# Patient Record
Sex: FEMALE | Race: WHITE | NOT HISPANIC OR LATINO | Employment: OTHER | URBAN - METROPOLITAN AREA
[De-identification: names, ages, dates, MRNs, and addresses within clinical notes are randomized per-mention and may not be internally consistent; named-entity substitution may affect disease eponyms.]

---

## 2017-01-06 ENCOUNTER — AMBULATORY - HEALTHEAST (OUTPATIENT)
Dept: SLEEP MEDICINE | Facility: CLINIC | Age: 72
End: 2017-01-06

## 2017-01-06 ENCOUNTER — OFFICE VISIT - HEALTHEAST (OUTPATIENT)
Dept: SLEEP MEDICINE | Facility: CLINIC | Age: 72
End: 2017-01-06

## 2017-01-06 DIAGNOSIS — G47.8 SLEEP DYSFUNCTION WITH SLEEP STAGE DISTURBANCE: ICD-10-CM

## 2017-01-06 DIAGNOSIS — G47.10 HYPERSOMNIA, UNSPECIFIED: ICD-10-CM

## 2017-01-06 DIAGNOSIS — G47.34 SLEEP RELATED HYPOXIA: ICD-10-CM

## 2017-01-06 DIAGNOSIS — G47.33 OSA (OBSTRUCTIVE SLEEP APNEA): ICD-10-CM

## 2017-01-06 DIAGNOSIS — G47.31 CENTRAL SLEEP APNEA: ICD-10-CM

## 2017-01-06 ASSESSMENT — MIFFLIN-ST. JEOR: SCORE: 1416.22

## 2017-01-11 ENCOUNTER — RECORDS - HEALTHEAST (OUTPATIENT)
Dept: ADMINISTRATIVE | Facility: OTHER | Age: 72
End: 2017-01-11

## 2017-01-26 ENCOUNTER — RECORDS - HEALTHEAST (OUTPATIENT)
Dept: ADMINISTRATIVE | Facility: OTHER | Age: 72
End: 2017-01-26

## 2017-02-15 ENCOUNTER — RECORDS - HEALTHEAST (OUTPATIENT)
Dept: ADMINISTRATIVE | Facility: OTHER | Age: 72
End: 2017-02-15

## 2017-03-02 ENCOUNTER — COMMUNICATION - HEALTHEAST (OUTPATIENT)
Dept: CARDIOLOGY | Facility: CLINIC | Age: 72
End: 2017-03-02

## 2017-03-02 DIAGNOSIS — E78.5 HYPERLIPIDEMIA: ICD-10-CM

## 2017-03-27 ENCOUNTER — OFFICE VISIT - HEALTHEAST (OUTPATIENT)
Dept: SLEEP MEDICINE | Facility: CLINIC | Age: 72
End: 2017-03-27

## 2017-03-27 DIAGNOSIS — G47.31 CENTRAL SLEEP APNEA: ICD-10-CM

## 2017-03-27 DIAGNOSIS — G47.33 OSA (OBSTRUCTIVE SLEEP APNEA): ICD-10-CM

## 2017-03-27 DIAGNOSIS — G47.34 SLEEP RELATED HYPOXIA: ICD-10-CM

## 2017-03-27 DIAGNOSIS — R53.83 FATIGUE, UNSPECIFIED TYPE: ICD-10-CM

## 2017-03-27 ASSESSMENT — MIFFLIN-ST. JEOR: SCORE: 1402.61

## 2017-03-28 ENCOUNTER — COMMUNICATION - HEALTHEAST (OUTPATIENT)
Dept: SLEEP MEDICINE | Facility: CLINIC | Age: 72
End: 2017-03-28

## 2017-03-31 ENCOUNTER — AMBULATORY - HEALTHEAST (OUTPATIENT)
Dept: CARDIOLOGY | Facility: CLINIC | Age: 72
End: 2017-03-31

## 2017-04-05 ENCOUNTER — AMBULATORY - HEALTHEAST (OUTPATIENT)
Dept: CARDIOLOGY | Facility: CLINIC | Age: 72
End: 2017-04-05

## 2017-04-05 ENCOUNTER — OFFICE VISIT - HEALTHEAST (OUTPATIENT)
Dept: CARDIOLOGY | Facility: CLINIC | Age: 72
End: 2017-04-05

## 2017-04-05 DIAGNOSIS — E78.5 DYSLIPIDEMIA: ICD-10-CM

## 2017-04-05 DIAGNOSIS — I25.10 CORONARY ARTERY DISEASE INVOLVING NATIVE CORONARY ARTERY OF NATIVE HEART WITHOUT ANGINA PECTORIS: ICD-10-CM

## 2017-04-05 DIAGNOSIS — I10 ESSENTIAL HYPERTENSION: ICD-10-CM

## 2017-04-05 LAB
CREAT SERPL-MCNC: 0.77 MG/DL (ref 0.6–1.1)
GFR SERPL CREATININE-BSD FRML MDRD: >60 ML/MIN/1.73M2

## 2017-04-05 ASSESSMENT — MIFFLIN-ST. JEOR: SCORE: 1398.08

## 2017-04-14 ENCOUNTER — COMMUNICATION - HEALTHEAST (OUTPATIENT)
Dept: ONCOLOGY | Facility: HOSPITAL | Age: 72
End: 2017-04-14

## 2017-04-18 ENCOUNTER — COMMUNICATION - HEALTHEAST (OUTPATIENT)
Dept: CARDIOLOGY | Facility: CLINIC | Age: 72
End: 2017-04-18

## 2017-04-18 ENCOUNTER — AMBULATORY - HEALTHEAST (OUTPATIENT)
Dept: INFUSION THERAPY | Facility: HOSPITAL | Age: 72
End: 2017-04-18

## 2017-04-18 ENCOUNTER — OFFICE VISIT - HEALTHEAST (OUTPATIENT)
Dept: ONCOLOGY | Facility: HOSPITAL | Age: 72
End: 2017-04-18

## 2017-04-18 DIAGNOSIS — C50.411 BREAST CANCER OF UPPER-OUTER QUADRANT OF RIGHT FEMALE BREAST (H): ICD-10-CM

## 2017-04-18 DIAGNOSIS — Z12.31 ENCOUNTER FOR SCREENING MAMMOGRAM FOR MALIGNANT NEOPLASM OF BREAST: ICD-10-CM

## 2017-04-20 ENCOUNTER — HOSPITAL ENCOUNTER (OUTPATIENT)
Dept: CT IMAGING | Facility: CLINIC | Age: 72
Discharge: HOME | End: 2017-04-20
Attending: INTERNAL MEDICINE

## 2017-04-20 DIAGNOSIS — I25.10 CORONARY ARTERY DISEASE INVOLVING NATIVE CORONARY ARTERY OF NATIVE HEART WITHOUT ANGINA PECTORIS: ICD-10-CM

## 2017-04-20 LAB
BSA FOR ECHO PROCEDURE: 2.04 M2
CV CALCIUM SCORE AGATSTON LM: 0
CV CALCIUM SCORING AGATSON LAD: 0
CV CALCIUM SCORING AGATSTON CX: 0
CV CALCIUM SCORING AGATSTON RCA: 0
CV CALCIUM SCORING AGATSTON TOTAL: 0
LEFT VENTRICLE HEART RATE: 52 BPM

## 2017-04-20 ASSESSMENT — MIFFLIN-ST. JEOR: SCORE: 1398.08

## 2017-05-28 ENCOUNTER — COMMUNICATION - HEALTHEAST (OUTPATIENT)
Dept: CARDIOLOGY | Facility: CLINIC | Age: 72
End: 2017-05-28

## 2017-05-28 DIAGNOSIS — E78.5 HYPERLIPEMIA: ICD-10-CM

## 2017-07-06 ENCOUNTER — OFFICE VISIT - HEALTHEAST (OUTPATIENT)
Dept: SLEEP MEDICINE | Facility: CLINIC | Age: 72
End: 2017-07-06

## 2017-07-06 DIAGNOSIS — G47.8 SLEEP DYSFUNCTION WITH SLEEP STAGE DISTURBANCE: ICD-10-CM

## 2017-07-06 DIAGNOSIS — G47.31 CENTRAL SLEEP APNEA: ICD-10-CM

## 2017-07-06 DIAGNOSIS — G47.10 HYPERSOMNIA, UNSPECIFIED: ICD-10-CM

## 2017-07-06 DIAGNOSIS — G47.33 OSA (OBSTRUCTIVE SLEEP APNEA): ICD-10-CM

## 2017-07-06 ASSESSMENT — MIFFLIN-ST. JEOR: SCORE: 1420.3

## 2017-10-27 ENCOUNTER — HOSPITAL ENCOUNTER (OUTPATIENT)
Dept: MAMMOGRAPHY | Facility: HOSPITAL | Age: 72
Discharge: HOME | End: 2017-10-27

## 2017-10-27 ENCOUNTER — COMMUNICATION - HEALTHEAST (OUTPATIENT)
Dept: ONCOLOGY | Facility: HOSPITAL | Age: 72
End: 2017-10-27

## 2017-10-27 DIAGNOSIS — C50.411 BREAST CANCER OF UPPER-OUTER QUADRANT OF RIGHT FEMALE BREAST (H): ICD-10-CM

## 2017-10-27 DIAGNOSIS — Z12.31 ENCOUNTER FOR SCREENING MAMMOGRAM FOR MALIGNANT NEOPLASM OF BREAST: ICD-10-CM

## 2017-10-31 ENCOUNTER — RECORDS - HEALTHEAST (OUTPATIENT)
Dept: LAB | Facility: CLINIC | Age: 72
End: 2017-10-31

## 2017-10-31 LAB
CHOLEST SERPL-MCNC: 166 MG/DL
FASTING STATUS PATIENT QL REPORTED: YES
HDLC SERPL-MCNC: 51 MG/DL
LDLC SERPL CALC-MCNC: 90 MG/DL
TRIGL SERPL-MCNC: 126 MG/DL

## 2018-02-17 ENCOUNTER — COMMUNICATION - HEALTHEAST (OUTPATIENT)
Dept: CARDIOLOGY | Facility: CLINIC | Age: 73
End: 2018-02-17

## 2018-02-17 DIAGNOSIS — E78.5 HYPERLIPIDEMIA: ICD-10-CM

## 2018-02-19 RX ORDER — ATORVASTATIN CALCIUM 10 MG/1
TABLET, FILM COATED ORAL
Qty: 90 TABLET | Refills: 1 | Status: SHIPPED | OUTPATIENT
Start: 2018-02-19

## 2018-04-18 ENCOUNTER — AMBULATORY - HEALTHEAST (OUTPATIENT)
Dept: INFUSION THERAPY | Facility: HOSPITAL | Age: 73
End: 2018-04-18

## 2018-04-18 ENCOUNTER — OFFICE VISIT - HEALTHEAST (OUTPATIENT)
Dept: ONCOLOGY | Facility: HOSPITAL | Age: 73
End: 2018-04-18

## 2018-04-18 DIAGNOSIS — C50.411 MALIGNANT NEOPLASM OF UPPER-OUTER QUADRANT OF RIGHT BREAST IN FEMALE, ESTROGEN RECEPTOR POSITIVE (H): ICD-10-CM

## 2018-04-18 DIAGNOSIS — Z17.0 MALIGNANT NEOPLASM OF UPPER-OUTER QUADRANT OF RIGHT BREAST IN FEMALE, ESTROGEN RECEPTOR POSITIVE (H): ICD-10-CM

## 2018-04-18 DIAGNOSIS — C50.411 BREAST CANCER OF UPPER-OUTER QUADRANT OF RIGHT FEMALE BREAST (H): ICD-10-CM

## 2018-04-18 LAB
ALBUMIN SERPL-MCNC: 3.6 G/DL (ref 3.5–5)
ALP SERPL-CCNC: 84 U/L (ref 45–120)
ALT SERPL W P-5'-P-CCNC: 18 U/L (ref 0–45)
ANION GAP SERPL CALCULATED.3IONS-SCNC: 11 MMOL/L (ref 5–18)
AST SERPL W P-5'-P-CCNC: 21 U/L (ref 0–40)
BILIRUB SERPL-MCNC: 0.6 MG/DL (ref 0–1)
BUN SERPL-MCNC: 14 MG/DL (ref 8–28)
CALCIUM SERPL-MCNC: 9.8 MG/DL (ref 8.5–10.5)
CHLORIDE BLD-SCNC: 108 MMOL/L (ref 98–107)
CO2 SERPL-SCNC: 29 MMOL/L (ref 22–31)
CREAT SERPL-MCNC: 0.82 MG/DL (ref 0.6–1.1)
GFR SERPL CREATININE-BSD FRML MDRD: >60 ML/MIN/1.73M2
GLUCOSE BLD-MCNC: 121 MG/DL (ref 70–125)
POTASSIUM BLD-SCNC: 4.3 MMOL/L (ref 3.5–5)
PROT SERPL-MCNC: 7.3 G/DL (ref 6–8)
SODIUM SERPL-SCNC: 148 MMOL/L (ref 136–145)

## 2018-05-02 ENCOUNTER — AMBULATORY - HEALTHEAST (OUTPATIENT)
Dept: SCHEDULING | Facility: CLINIC | Age: 73
End: 2018-05-02

## 2018-05-02 ENCOUNTER — COMMUNICATION - HEALTHEAST (OUTPATIENT)
Dept: ONCOLOGY | Facility: HOSPITAL | Age: 73
End: 2018-05-02

## 2018-05-02 DIAGNOSIS — C50.411 MALIGNANT NEOPLASM OF UPPER-OUTER QUADRANT OF RIGHT BREAST IN FEMALE, ESTROGEN RECEPTOR POSITIVE (H): ICD-10-CM

## 2018-05-02 DIAGNOSIS — M81.0 OSTEOPOROSIS: ICD-10-CM

## 2018-05-02 DIAGNOSIS — Z17.0 MALIGNANT NEOPLASM OF UPPER-OUTER QUADRANT OF RIGHT BREAST IN FEMALE, ESTROGEN RECEPTOR POSITIVE (H): ICD-10-CM

## 2018-05-29 ENCOUNTER — COMMUNICATION - HEALTHEAST (OUTPATIENT)
Dept: ONCOLOGY | Facility: HOSPITAL | Age: 73
End: 2018-05-29

## 2018-08-02 ENCOUNTER — RECORDS - HEALTHEAST (OUTPATIENT)
Dept: LAB | Facility: CLINIC | Age: 73
End: 2018-08-02

## 2018-08-02 LAB
ANION GAP SERPL CALCULATED.3IONS-SCNC: 11 MMOL/L (ref 5–18)
BUN SERPL-MCNC: 12 MG/DL (ref 8–28)
CALCIUM SERPL-MCNC: 10.2 MG/DL (ref 8.5–10.5)
CHLORIDE BLD-SCNC: 108 MMOL/L (ref 98–107)
CO2 SERPL-SCNC: 24 MMOL/L (ref 22–31)
CREAT SERPL-MCNC: 0.86 MG/DL (ref 0.6–1.1)
GFR SERPL CREATININE-BSD FRML MDRD: >60 ML/MIN/1.73M2
GLUCOSE BLD-MCNC: 119 MG/DL (ref 70–125)
POTASSIUM BLD-SCNC: 4.6 MMOL/L (ref 3.5–5)
SODIUM SERPL-SCNC: 143 MMOL/L (ref 136–145)

## 2018-08-13 ENCOUNTER — COMMUNICATION - HEALTHEAST (OUTPATIENT)
Dept: CARDIOLOGY | Facility: CLINIC | Age: 73
End: 2018-08-13

## 2018-08-27 ENCOUNTER — COMMUNICATION - HEALTHEAST (OUTPATIENT)
Dept: CARDIOLOGY | Facility: CLINIC | Age: 73
End: 2018-08-27

## 2018-11-02 ENCOUNTER — HOSPITAL ENCOUNTER (OUTPATIENT)
Dept: MAMMOGRAPHY | Facility: CLINIC | Age: 73
Discharge: HOME | End: 2018-11-02
Attending: FAMILY MEDICINE

## 2018-11-02 DIAGNOSIS — Z12.31 VISIT FOR SCREENING MAMMOGRAM: ICD-10-CM

## 2018-11-08 ENCOUNTER — RECORDS - HEALTHEAST (OUTPATIENT)
Dept: LAB | Facility: CLINIC | Age: 73
End: 2018-11-08

## 2018-11-08 LAB
ALBUMIN SERPL-MCNC: 3.8 G/DL (ref 3.5–5)
ALP SERPL-CCNC: 82 U/L (ref 45–120)
ALT SERPL W P-5'-P-CCNC: 21 U/L (ref 0–45)
ANION GAP SERPL CALCULATED.3IONS-SCNC: 13 MMOL/L (ref 5–18)
AST SERPL W P-5'-P-CCNC: 22 U/L (ref 0–40)
BILIRUB SERPL-MCNC: 0.6 MG/DL (ref 0–1)
BUN SERPL-MCNC: 14 MG/DL (ref 8–28)
CALCIUM SERPL-MCNC: 9.9 MG/DL (ref 8.5–10.5)
CHLORIDE BLD-SCNC: 109 MMOL/L (ref 98–107)
CHOLEST SERPL-MCNC: 184 MG/DL
CO2 SERPL-SCNC: 22 MMOL/L (ref 22–31)
CREAT SERPL-MCNC: 0.8 MG/DL (ref 0.6–1.1)
FASTING STATUS PATIENT QL REPORTED: YES
GFR SERPL CREATININE-BSD FRML MDRD: >60 ML/MIN/1.73M2
GLUCOSE BLD-MCNC: 98 MG/DL (ref 70–125)
HDLC SERPL-MCNC: 55 MG/DL
LDLC SERPL CALC-MCNC: 90 MG/DL
POTASSIUM BLD-SCNC: 4 MMOL/L (ref 3.5–5)
PROT SERPL-MCNC: 6.8 G/DL (ref 6–8)
SODIUM SERPL-SCNC: 144 MMOL/L (ref 136–145)
TRIGL SERPL-MCNC: 196 MG/DL
TSH SERPL DL<=0.005 MIU/L-ACNC: 2.16 UIU/ML (ref 0.3–5)

## 2019-04-26 ENCOUNTER — RECORDS - HEALTHEAST (OUTPATIENT)
Dept: ADMINISTRATIVE | Facility: OTHER | Age: 74
End: 2019-04-26

## 2019-04-29 ENCOUNTER — OFFICE VISIT - HEALTHEAST (OUTPATIENT)
Dept: ONCOLOGY | Facility: HOSPITAL | Age: 74
End: 2019-04-29

## 2019-04-29 DIAGNOSIS — Z17.0 MALIGNANT NEOPLASM OF UPPER-OUTER QUADRANT OF RIGHT BREAST IN FEMALE, ESTROGEN RECEPTOR POSITIVE (H): ICD-10-CM

## 2019-04-29 DIAGNOSIS — C50.411 MALIGNANT NEOPLASM OF UPPER-OUTER QUADRANT OF RIGHT BREAST IN FEMALE, ESTROGEN RECEPTOR POSITIVE (H): ICD-10-CM

## 2019-10-10 ENCOUNTER — OFFICE VISIT - HEALTHEAST (OUTPATIENT)
Dept: SLEEP MEDICINE | Facility: CLINIC | Age: 74
End: 2019-10-10

## 2019-10-10 DIAGNOSIS — G47.31 CENTRAL SLEEP APNEA: ICD-10-CM

## 2019-10-10 DIAGNOSIS — R03.0 ELEVATED BLOOD PRESSURE READING: ICD-10-CM

## 2019-10-10 DIAGNOSIS — G47.33 OBSTRUCTIVE SLEEP APNEA: ICD-10-CM

## 2019-10-10 ASSESSMENT — MIFFLIN-ST. JEOR: SCORE: 1438.9

## 2019-11-12 ENCOUNTER — RECORDS - HEALTHEAST (OUTPATIENT)
Dept: LAB | Facility: CLINIC | Age: 74
End: 2019-11-12

## 2019-11-12 LAB
ALBUMIN SERPL-MCNC: 3.6 G/DL (ref 3.5–5)
ALP SERPL-CCNC: 73 U/L (ref 45–120)
ALT SERPL W P-5'-P-CCNC: 17 U/L (ref 0–45)
ANION GAP SERPL CALCULATED.3IONS-SCNC: 14 MMOL/L (ref 5–18)
AST SERPL W P-5'-P-CCNC: 19 U/L (ref 0–40)
BILIRUB SERPL-MCNC: 0.5 MG/DL (ref 0–1)
BUN SERPL-MCNC: 11 MG/DL (ref 8–28)
CALCIUM SERPL-MCNC: 9.4 MG/DL (ref 8.5–10.5)
CHLORIDE BLD-SCNC: 111 MMOL/L (ref 98–107)
CHOLEST SERPL-MCNC: 137 MG/DL
CO2 SERPL-SCNC: 19 MMOL/L (ref 22–31)
CREAT SERPL-MCNC: 0.7 MG/DL (ref 0.6–1.1)
FASTING STATUS PATIENT QL REPORTED: YES
GFR SERPL CREATININE-BSD FRML MDRD: >60 ML/MIN/1.73M2
GLUCOSE BLD-MCNC: 93 MG/DL (ref 70–125)
HDLC SERPL-MCNC: 49 MG/DL
LDLC SERPL CALC-MCNC: 68 MG/DL
POTASSIUM BLD-SCNC: 3.8 MMOL/L (ref 3.5–5)
PROT SERPL-MCNC: 6.6 G/DL (ref 6–8)
SODIUM SERPL-SCNC: 144 MMOL/L (ref 136–145)
TRIGL SERPL-MCNC: 99 MG/DL
TSH SERPL DL<=0.005 MIU/L-ACNC: 0.4 UIU/ML (ref 0.3–5)

## 2019-11-26 ENCOUNTER — HOSPITAL ENCOUNTER (OUTPATIENT)
Dept: MAMMOGRAPHY | Facility: CLINIC | Age: 74
Discharge: HOME | End: 2019-11-26
Attending: FAMILY MEDICINE

## 2019-11-26 DIAGNOSIS — Z12.31 VISIT FOR SCREENING MAMMOGRAM: ICD-10-CM

## 2019-11-29 ENCOUNTER — RECORDS - HEALTHEAST (OUTPATIENT)
Dept: ADMINISTRATIVE | Facility: OTHER | Age: 74
End: 2019-11-29

## 2019-11-29 ENCOUNTER — AMBULATORY - HEALTHEAST (OUTPATIENT)
Dept: CARDIOLOGY | Facility: CLINIC | Age: 74
End: 2019-11-29

## 2019-12-04 ENCOUNTER — OFFICE VISIT - HEALTHEAST (OUTPATIENT)
Dept: CARDIOLOGY | Facility: CLINIC | Age: 74
End: 2019-12-04

## 2019-12-04 DIAGNOSIS — E78.5 DYSLIPIDEMIA: ICD-10-CM

## 2019-12-04 DIAGNOSIS — I35.0 NONRHEUMATIC AORTIC VALVE STENOSIS: ICD-10-CM

## 2019-12-13 ENCOUNTER — HOSPITAL ENCOUNTER (OUTPATIENT)
Dept: CARDIOLOGY | Facility: HOSPITAL | Age: 74
Discharge: HOME | End: 2019-12-13
Attending: INTERNAL MEDICINE

## 2019-12-13 DIAGNOSIS — I35.0 NONRHEUMATIC AORTIC VALVE STENOSIS: ICD-10-CM

## 2019-12-13 LAB
AORTIC ROOT: 3.5 CM
AORTIC VALVE MEAN VELOCITY: 102 CM/S
AV DIMENSIONLESS INDEX VTI: 0.8
AV MEAN GRADIENT: 5 MMHG
AV PEAK GRADIENT: 11.6 MMHG
AV VALVE AREA: 2.2 CM2
BSA FOR ECHO PROCEDURE: 2.07 M2
CV BLOOD PRESSURE: ABNORMAL MMHG
CV ECHO HEIGHT: 61 IN
CV ECHO WEIGHT: 220 LBS
DOP CALC AO PEAK VEL: 170 CM/S
DOP CALC AO VTI: 34.2 CM
DOP CALC LVOT AREA: 2.83 CM2
DOP CALC LVOT DIAMETER: 1.9 CM
DOP CALC LVOT STROKE VOLUME: 76.5 CM3
DOP CALCLVOT PEAK VEL VTI: 27 CM
EJECTION FRACTION: 74 % (ref 55–75)
FRACTIONAL SHORTENING: 42 % (ref 28–44)
INTERVENTRICULAR SEPTUM IN END DIASTOLE: 1 CM (ref 0.6–0.9)
IVS/PW RATIO: 0.8
LA AREA 1: 16.5 CM2
LA AREA 2: 20.2 CM2
LEFT ATRIUM LENGTH: 5.37 CM
LEFT ATRIUM SIZE: 4 CM
LEFT ATRIUM TO AORTIC ROOT RATIO: 1.14 NO UNITS
LEFT ATRIUM VOLUME INDEX: 25.5 ML/M2
LEFT ATRIUM VOLUME: 52.8 ML
LEFT VENTRICLE CARDIAC INDEX: 2.6 L/MIN/M2
LEFT VENTRICLE CARDIAC OUTPUT: 5.4 L/MIN
LEFT VENTRICLE DIASTOLIC VOLUME INDEX: 38.6 CM3/M2 (ref 29–61)
LEFT VENTRICLE DIASTOLIC VOLUME: 80 CM3 (ref 46–106)
LEFT VENTRICLE HEART RATE: 71 BPM
LEFT VENTRICLE MASS INDEX: 106.4 G/M2
LEFT VENTRICLE SYSTOLIC VOLUME INDEX: 10.1 CM3/M2 (ref 8–24)
LEFT VENTRICLE SYSTOLIC VOLUME: 21 CM3 (ref 14–42)
LEFT VENTRICULAR INTERNAL DIMENSION IN DIASTOLE: 5 CM (ref 3.8–5.2)
LEFT VENTRICULAR INTERNAL DIMENSION IN SYSTOLE: 2.9 CM (ref 2.2–3.5)
LEFT VENTRICULAR MASS: 220.3 G
LEFT VENTRICULAR OUTFLOW TRACT MEAN GRADIENT: 3 MMHG
LEFT VENTRICULAR OUTFLOW TRACT MEAN VELOCITY: 84.4 CM/S
LEFT VENTRICULAR POSTERIOR WALL IN END DIASTOLE: 1.3 CM (ref 0.6–0.9)
LV STROKE VOLUME INDEX: 37 ML/M2
MITRAL VALVE E/A RATIO: 0.9
MV AVERAGE E/E' RATIO: 9.9 CM/S
MV DECELERATION TIME: 218 MS
MV E'TISSUE VEL-LAT: 10.6 CM/S
MV E'TISSUE VEL-MED: 8.97 CM/S
MV LATERAL E/E' RATIO: 9.2
MV MEDIAL E/E' RATIO: 10.8
MV PEAK A VELOCITY: 108 CM/S
MV PEAK E VELOCITY: 97.2 CM/S
NUC REST DIASTOLIC VOLUME INDEX: 3524 LBS
NUC REST SYSTOLIC VOLUME INDEX: 61 IN
TRICUSPID VALVE ANULAR PLANE SYSTOLIC EXCURSION: 2.5 CM

## 2019-12-13 ASSESSMENT — MIFFLIN-ST. JEOR: SCORE: 1426.43

## 2020-07-20 ENCOUNTER — COMMUNICATION - HEALTHEAST (OUTPATIENT)
Dept: ADMINISTRATIVE | Facility: HOSPITAL | Age: 75
End: 2020-07-20

## 2020-07-23 ENCOUNTER — COMMUNICATION - HEALTHEAST (OUTPATIENT)
Dept: ADMINISTRATIVE | Facility: HOSPITAL | Age: 75
End: 2020-07-23

## 2020-08-04 ENCOUNTER — OFFICE VISIT - HEALTHEAST (OUTPATIENT)
Dept: ONCOLOGY | Facility: HOSPITAL | Age: 75
End: 2020-08-04

## 2020-08-04 DIAGNOSIS — Z17.0 MALIGNANT NEOPLASM OF UPPER-OUTER QUADRANT OF RIGHT BREAST IN FEMALE, ESTROGEN RECEPTOR POSITIVE (H): ICD-10-CM

## 2020-08-04 DIAGNOSIS — C50.411 MALIGNANT NEOPLASM OF UPPER-OUTER QUADRANT OF RIGHT BREAST IN FEMALE, ESTROGEN RECEPTOR POSITIVE (H): ICD-10-CM

## 2020-08-04 DIAGNOSIS — Z12.31 ENCOUNTER FOR SCREENING MAMMOGRAM FOR MALIGNANT NEOPLASM OF BREAST: ICD-10-CM

## 2020-08-04 DIAGNOSIS — M81.0 OSTEOPOROSIS WITHOUT CURRENT PATHOLOGICAL FRACTURE, UNSPECIFIED OSTEOPOROSIS TYPE: ICD-10-CM

## 2020-08-04 ASSESSMENT — MIFFLIN-ST. JEOR: SCORE: 1436.63

## 2020-09-14 ENCOUNTER — COMMUNICATION - HEALTHEAST (OUTPATIENT)
Dept: ONCOLOGY | Facility: HOSPITAL | Age: 75
End: 2020-09-14

## 2020-09-28 ENCOUNTER — TELEPHONE (OUTPATIENT)
Dept: SLEEP MEDICINE | Facility: CLINIC | Age: 75
End: 2020-09-28

## 2020-09-28 DIAGNOSIS — G47.33 OBSTRUCTIVE SLEEP APNEA: Primary | ICD-10-CM

## 2020-09-28 NOTE — TELEPHONE ENCOUNTER
Reason for call:  Medication   If this is a refill request, has the caller requested the refill from the pharmacy already? Yes  Will the patient be using a [facility]? No  Name of the pharmacy and phone number for the current request: [facility] Fax: [phone]    Name of the medication requested: CPAP supplies    Other request: pt would like to switch to [facility]    Phone number to reach patient:  Home number on file [phone] (home)    Best Time:  ANY    Can we leave a detailed message on this number?  YES    Travel screening: Not Applicable

## 2020-11-18 ENCOUNTER — RECORDS - HEALTHEAST (OUTPATIENT)
Dept: LAB | Facility: CLINIC | Age: 75
End: 2020-11-18

## 2020-11-18 LAB
CHOLEST SERPL-MCNC: 163 MG/DL
FASTING STATUS PATIENT QL REPORTED: YES
HDLC SERPL-MCNC: 52 MG/DL
LDLC SERPL CALC-MCNC: 83 MG/DL
TRIGL SERPL-MCNC: 141 MG/DL
TSH SERPL DL<=0.005 MIU/L-ACNC: 0.47 UIU/ML (ref 0.3–5)

## 2020-11-19 LAB
ALBUMIN SERPL-MCNC: 4 G/DL (ref 3.5–5)
ALP SERPL-CCNC: 84 U/L (ref 45–120)
ALT SERPL W P-5'-P-CCNC: 19 U/L (ref 0–45)
ANION GAP SERPL CALCULATED.3IONS-SCNC: 10 MMOL/L (ref 5–18)
AST SERPL W P-5'-P-CCNC: 21 U/L (ref 0–40)
BILIRUB SERPL-MCNC: 0.6 MG/DL (ref 0–1)
BUN SERPL-MCNC: 16 MG/DL (ref 8–28)
CALCIUM SERPL-MCNC: 9.4 MG/DL (ref 8.5–10.5)
CHLORIDE BLD-SCNC: 106 MMOL/L (ref 98–107)
CO2 SERPL-SCNC: 25 MMOL/L (ref 22–31)
CREAT SERPL-MCNC: 0.79 MG/DL (ref 0.6–1.1)
GFR SERPL CREATININE-BSD FRML MDRD: >60 ML/MIN/1.73M2
GLUCOSE BLD-MCNC: 102 MG/DL (ref 70–125)
POTASSIUM BLD-SCNC: 3.8 MMOL/L (ref 3.5–5)
PROT SERPL-MCNC: 7 G/DL (ref 6–8)
SODIUM SERPL-SCNC: 141 MMOL/L (ref 136–145)

## 2020-12-17 ENCOUNTER — HOSPITAL ENCOUNTER (OUTPATIENT)
Dept: MAMMOGRAPHY | Facility: CLINIC | Age: 75
Discharge: HOME | End: 2020-12-17

## 2020-12-17 DIAGNOSIS — C50.411 MALIGNANT NEOPLASM OF UPPER-OUTER QUADRANT OF RIGHT BREAST IN FEMALE, ESTROGEN RECEPTOR POSITIVE (H): ICD-10-CM

## 2020-12-17 DIAGNOSIS — Z17.0 MALIGNANT NEOPLASM OF UPPER-OUTER QUADRANT OF RIGHT BREAST IN FEMALE, ESTROGEN RECEPTOR POSITIVE (H): ICD-10-CM

## 2020-12-17 DIAGNOSIS — Z12.31 ENCOUNTER FOR SCREENING MAMMOGRAM FOR MALIGNANT NEOPLASM OF BREAST: ICD-10-CM

## 2021-01-06 ENCOUNTER — COMMUNICATION - HEALTHEAST (OUTPATIENT)
Dept: ADMINISTRATIVE | Facility: HOSPITAL | Age: 76
End: 2021-01-06

## 2021-05-13 ENCOUNTER — RECORDS - HEALTHEAST (OUTPATIENT)
Dept: ADMINISTRATIVE | Facility: OTHER | Age: 76
End: 2021-05-13

## 2021-05-13 ENCOUNTER — RECORDS - HEALTHEAST (OUTPATIENT)
Dept: CARDIOLOGY | Facility: CLINIC | Age: 76
End: 2021-05-13

## 2021-05-19 ENCOUNTER — OFFICE VISIT - HEALTHEAST (OUTPATIENT)
Dept: CARDIOLOGY | Facility: CLINIC | Age: 76
End: 2021-05-19

## 2021-05-19 DIAGNOSIS — R60.9 EDEMA: ICD-10-CM

## 2021-05-19 DIAGNOSIS — I35.0 NONRHEUMATIC AORTIC VALVE STENOSIS: ICD-10-CM

## 2021-05-19 DIAGNOSIS — E78.5 DYSLIPIDEMIA: ICD-10-CM

## 2021-05-19 DIAGNOSIS — I10 ESSENTIAL HYPERTENSION: ICD-10-CM

## 2021-05-25 ENCOUNTER — RECORDS - HEALTHEAST (OUTPATIENT)
Dept: ADMINISTRATIVE | Facility: CLINIC | Age: 76
End: 2021-05-25

## 2021-05-26 ENCOUNTER — RECORDS - HEALTHEAST (OUTPATIENT)
Dept: ADMINISTRATIVE | Facility: CLINIC | Age: 76
End: 2021-05-26

## 2021-05-27 ENCOUNTER — RECORDS - HEALTHEAST (OUTPATIENT)
Dept: ADMINISTRATIVE | Facility: CLINIC | Age: 76
End: 2021-05-27

## 2021-05-27 VITALS
DIASTOLIC BLOOD PRESSURE: 80 MMHG | SYSTOLIC BLOOD PRESSURE: 140 MMHG | RESPIRATION RATE: 16 BRPM | WEIGHT: 223.6 LBS | HEART RATE: 64 BPM

## 2021-05-28 ENCOUNTER — RECORDS - HEALTHEAST (OUTPATIENT)
Dept: ADMINISTRATIVE | Facility: CLINIC | Age: 76
End: 2021-05-28

## 2021-05-28 NOTE — PROGRESS NOTES
[facility] Progress Note    Patient: Eli Vivas  MRN: [id number]  Date of Service: 4/29/2019        Reason for visit      1. Malignant neoplasm of upper-outer quadrant of right breast in female, estrogen receptor positive (H)        Assessment     1. A 73 y.o.  woman postmenopausal CA breast, right side, T1 N0 M0, stage I, ER/NM positive, HER-2/breana negative, status post lumpectomy, radiation  and 2 years of tamoxifen on Arimidex from December 2013 till 2016.  Her performance status is 1.  2.   Mild osteopenia worse on her bone density in 2018  3.   Normal endometrial thickening and no more dysfunctional uterine bleeding.  4.   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  5.  Long-term Prilosec use for gastroesophageal reflux disease.    Plan     1. Continue observation. Mammogram in October. Next bone density in 2020.  2. Follow-up with me in 12 months time.  3. High calcium diet and vit d supplement. Try to get off prilosec as it may be contributing to her osteoporosis.  4. F/u with Dr. Kapoor  for other medical issues.    Clinical stage      Breast cancer of upper-outer quadrant of right female breast    Primary site: Breast (Right)    Staging method: AJCC 7th Edition    Pathologic free text: ER+/NM+/HER2 -ve    Pathologic: Stage IA (T1c, N0, cM0) - Signed by Brett Grijalva MD on 8/13/2014    Summary: Stage IA (T1c, N0, cM0)      History     Eli Vivas is a very pleasant 73 y.o. old female with a history of breast cancer diagnosed in October of 2011 located on the right side, measuring 1.3 cm in size, present on a mammogram, ER/NM positive, HER-2/breana negative, grade 1 for which she underwent radiation therapy and took tamoxifen until November of 2011.  Subsequent to that, after that we switched her to Arimidex. She continues to have some bone and joint side effects. She did try stopping Arimidex for a week but didn't really make much difference. In April 2014 she did have some spotting and then  she had what she calls a menstrual period and then had it again in May 2014. She was evaluated by Dr. Kline from [facility] and she has been told she has thickened endometrium.  She has not had any problems since then. Finished 5 years of AI therapy in October 2016.    Comes in today for her annual appointment. No new issues. She is continuing yearly mammograms. Had bone density in 2018 which showed some worsening.  She did try getting off of Prilosec last year in 2018 but had significant rebound acidity that she went back on it.  She has some achy knees which prevent her from walking very much.    Past Medical History     Past Medical History:   Diagnosis Date     Breast cancer (H) 2011    [place]     Chronic kidney disease      GERD (gastroesophageal reflux disease)      Hx of radiation therapy 2011     Hypertension      Hypothyroidism      Sleep apnea        Review of Systems   Constitutional  Constitutional (WDL): All constitutional elements are within defined limits  Neurosensory  Neurosensory (WDL): Exceptions to WDL  Ataxia: Asymptomatic, clinical or diagnostic observations only, intervention not indicated(Uses a cane; forgot it at home. )  Cardiovascular  Cardiovascular (WDL): Exceptions to WDL  Edema: Yes(Legs, ankles, feet. )  Pulmonary  Respiratory (WDL): Exceptions to WDL  Dyspnea: Shortness of breath with moderate exertion  Gastrointestinal  Gastrointestinal (WDL): Exceptions to WDL  Constipation: Occasional or intermittent symptoms, occasional use of stool softeners, laxatives, dietary modification, or enema  Esophagitis: Asymptomatic, clinical or diagnostic observations only, intervention not indicated(Acid refulx; on omeprazole. )  Dry Mouth: Symptomatic (e.g., dry or thick saliva) without significant dietary alteration, unstimulated saliva flow >0.2 ml/min  Genitourinary  Genitourinary (WDL): Exceptions to WDL(Some stress incontinence. )  Integumentary  Integumentary (WDL): All integumentary  elements are within defined limits  Patient Coping  Patient Coping: Accepting  Accompanied by  Accompanied by: Alone    ECOG performance status and Distress Assessment      ECOG Performance:    ECOG Performance Status: 1    Distress Assessment  Distress Assessment Score: No distress:     Pain Status  Currently in Pain: No/denies        Vital Signs     Vitals:    04/29/19 1026   BP: (!) 139/93   Pulse: 65   Temp: 98.4  F (36.9  C)   SpO2: 95%       Physical Exam     GENERAL: No acute distress. Cooperative in conversation.   HEENT: Pupils are equal, round and reactive. Oral mucosa is clean and intact. No ulcerations or mucositis noted. No bleeding noted.  RESP:Chest symmetric lungs are clear bilaterally per auscultation. Regular respiratory rate. No wheezes or rhonchi.  CV: Normal S1 S2 Regular, rate and rhythm. No murmurs.  ABD: Nondistended, soft, nontender. Positive bowel sounds. No organomegaly.   EXTREMITIES: No lower extremity edema.   NEURO: Non- focal. Alert and oriented x3.  Cranial nerves appear intact.  PSYCH: Within normal limits. No depression or anxiety.  SKIN: Warm dry intact.    LYMPH NODES: Bilateral cervical, supraclavicular, axillary lymph node examination was done.  Negative for any palpable adenopathy.  BREAST: Post lumpectomy changes in right breast. She does have induration around the scar upper inner quadrant of the breast due to mammosite. Left breast also has slight lumpiness in the upper outer quadrant. No nipple discharge in either breast.      Lab Results     Results for orders placed or performed in visit on 11/08/18   Thyroid Stimulating Hormone (TSH)   Result Value Ref Range    TSH 2.16 0.30 - 5.00 uIU/mL   Comprehensive Metabolic Panel   Result Value Ref Range    Sodium 144 136 - 145 mmol/L    Potassium 4.0 3.5 - 5.0 mmol/L    Chloride 109 (H) 98 - 107 mmol/L    CO2 22 22 - 31 mmol/L    Anion Gap, Calculation 13 5 - 18 mmol/L    Glucose 98 70 - 125 mg/dL    BUN 14 8 - 28 mg/dL     Creatinine 0.80 0.60 - 1.10 mg/dL    GFR MDRD Af Amer >60 >60 mL/min/1.73m2    GFR MDRD Non Af Amer >60 >60 mL/min/1.73m2    Bilirubin, Total 0.6 0.0 - 1.0 mg/dL    Calcium 9.9 8.5 - 10.5 mg/dL    Protein, Total 6.8 6.0 - 8.0 g/dL    Albumin 3.8 3.5 - 5.0 g/dL    Alkaline Phosphatase 82 45 - 120 U/L    AST 22 0 - 40 U/L    ALT 21 0 - 45 U/L   Lipid Cascade   Result Value Ref Range    Cholesterol 184 <=199 mg/dL    Triglycerides 196 (H) <=149 mg/dL    HDL Cholesterol 55 >=50 mg/dL    LDL Calculated 90 <=129 mg/dL    Patient Fasting > 8hrs? Yes          Imaging Results     No results found.      Brett Grijalva MD

## 2021-05-29 ENCOUNTER — RECORDS - HEALTHEAST (OUTPATIENT)
Dept: ADMINISTRATIVE | Facility: CLINIC | Age: 76
End: 2021-05-29

## 2021-05-30 ENCOUNTER — RECORDS - HEALTHEAST (OUTPATIENT)
Dept: ADMINISTRATIVE | Facility: CLINIC | Age: 76
End: 2021-05-30

## 2021-05-30 VITALS — WEIGHT: 218 LBS | HEIGHT: 61 IN | BODY MASS INDEX: 41.16 KG/M2

## 2021-05-30 VITALS — BODY MASS INDEX: 40.47 KG/M2 | WEIGHT: 214.2 LBS

## 2021-05-30 VITALS — HEIGHT: 61 IN | BODY MASS INDEX: 40.4 KG/M2 | WEIGHT: 214 LBS

## 2021-05-30 VITALS — BODY MASS INDEX: 40.4 KG/M2 | WEIGHT: 214 LBS | HEIGHT: 61 IN

## 2021-05-30 VITALS — WEIGHT: 215 LBS | HEIGHT: 61 IN | BODY MASS INDEX: 40.59 KG/M2

## 2021-05-31 VITALS — WEIGHT: 218.9 LBS | BODY MASS INDEX: 41.33 KG/M2 | HEIGHT: 61 IN

## 2021-06-01 VITALS — BODY MASS INDEX: 41.59 KG/M2 | WEIGHT: 220.1 LBS

## 2021-06-02 NOTE — PATIENT INSTRUCTIONS - HE
Follow-up with PCP regarding elevated blood pressure:   BP Readings from Last 3 Encounters:   10/10/19 (!) 181/95   04/29/19 (!) 139/93   04/18/18 150/68

## 2021-06-02 NOTE — PROGRESS NOTES
Order for Durable Medical Equipment was processed and equipment ordered.     DME provider: [facility]    Date Faxed: 10/10/2019    Ordering Provider: Ryan Farrell DO    PAP Order Type: Mask/Supply renewal    Fax Number: [phone]

## 2021-06-02 NOTE — PROGRESS NOTES
Dear Dr. Kapoor, Vinny JORDAN MD  [address] [place] [address],    Thank you for the opportunity to participate in the care of Eli Vivas.     She is a 73 y.o. y/o female patient who comes to the sleep medicine clinic for follow up.  The patient states that she is doing well on pressure therapy and has no complaints.  She would like to try different mask if possible.    Compliance Download data for 30 days:  Pressure setting: ASV  Residual AHI: 8.4 events per hour  Leak: Minimal  Compliance: 100%  Mask Tolerance: Good  Skin irritation: None    Past Medical History:   Diagnosis Date     Breast cancer (H) 2011    ca     Chronic kidney disease      GERD (gastroesophageal reflux disease)      Hx of radiation therapy 2011     Hypertension      Hypothyroidism      Sleep apnea        Past Surgical History:   Procedure Laterality Date     BREAST BIOPSY Right 2011    ca     BREAST LUMPECTOMY Right 2011     CATARACT EXTRACTION Bilateral        Social History     Socioeconomic History     Marital status: Single     Spouse name: Not on file     Number of children: Not on file     Years of education: Not on file     Highest education level: Not on file   Occupational History     Not on file   Social Needs     Financial resource strain: Not on file     Food insecurity:     Worry: Not on file     Inability: Not on file     Transportation needs:     Medical: Not on file     Non-medical: Not on file   Tobacco Use     Smoking status: Never Smoker     Smokeless tobacco: Never Used   Substance and Sexual Activity     Alcohol use: No     Drug use: No     Sexual activity: Never   Lifestyle     Physical activity:     Days per week: Not on file     Minutes per session: Not on file     Stress: Not on file   Relationships     Social connections:     Talks on phone: Not on file     Gets together: Not on file     Attends Sikhism service: Not on file     Active member of club or organization: Not on file     Attends meetings  of clubs or organizations: Not on file     Relationship status: Not on file     Intimate partner violence:     Fear of current or ex partner: Not on file     Emotionally abused: Not on file     Physically abused: Not on file     Forced sexual activity: Not on file   Other Topics Concern     Not on file   Social History Narrative     Not on file       Current Outpatient Medications   Medication Sig Dispense Refill     acetaminophen (TYLENOL) 500 MG tablet Take 500 mg by mouth as needed.        ascorbic acid (ASCORBIC ACID WITH MEHDI HIPS) 500 MG tablet Take 500 mg by mouth daily.       aspirin 81 MG tablet Take 81 mg by mouth daily.       atorvastatin (LIPITOR) 10 MG tablet TAKE ONE TABLET BY MOUTH ONCE DAILY AT BEDTIME 90 tablet 1     CALCIUM CARBONATE-VITAMIN D3 ORAL Take 1 tablet by mouth 2 (two) times a day.        cholecalciferol, vitamin D3, (VITAMIN D3) 2,000 unit cap Take 1 capsule by mouth daily.       fluocinonide (LIDEX) 0.05 % cream BRITTNY EXT AA BID PRN  3     GLUCOSAMINE SULFATE ORAL Take 2 tablets by mouth daily.        GUAIFENESIN/DEXTROMETHORPHAN (ROBITUSSIN DM MAX ORAL) Take by mouth as needed.       IBUPROFEN (ADVIL ORAL) Take by mouth as needed.       levothyroxine 88 mcg cap Take 88 mcg by mouth daily.       multivitamin (MULTIVITAMIN) per tablet Take 1 tablet by mouth daily.       omeprazole (PRILOSEC) 20 MG capsule Take 20 mg by mouth daily.        pseudoephedrine HCl (SUDAFED 24 HR) 240 mg Tb24 tablet Take 240 mg by mouth daily as needed.       PSYLLIUM SEED, WITH SUGAR, (METAMUCIL ORAL) Take 15 mL by mouth daily as needed.        verapamil 200 mg CPCT Take 200 mg by mouth at bedtime.              No current facility-administered medications for this visit.        Allergies   Allergen Reactions     Bextra [Valdecoxib] Other (See Comments)     Leg Pain     Vioxx [Rofecoxib] Other (See Comments)     Leg Pain     Adhesive Rash     Septra [Sulfamethoxazole-Trimethoprim] Rash       Epworths Sleepiness  "Scale 10/27/2016 3/27/2017 7/6/2017 10/10/2019   Sitting and reading 0 2 0 3   Watching TV 1 2 1 3   Sitting, inactive in a public place (e.g. a theatre or a meeting) 0 0 0 1   As a passenger in a car for an hour without a break 0 0 0 0   Lying down to rest in the afternoon when circumstances permit 1 2 1 3   Sitting and talking to someone 0 0 0 0   Sitting quietly after a lunch without alcohol 0 2 0 3   In a car, while stopped for a few minutes in traffic 0 0 0 0   Total score 2 8 2 13       Physical Exam:  BP (!) 181/95   Pulse 70   Ht 5' 1\" (1.549 m)   Wt (!) 223 lb (101.2 kg)   SpO2 96%   BMI 42.14 kg/m    BMI:Body mass index is 42.14 kg/m .   GEN: NAD, morbidly obese  Psych: normal mood, normal affect    Labs/Studies:    I reviewed the efficacy and compliance report from his device. Data summarized on the HPI and the PAP compliance flow sheet.       Assessment and Plan:  In summary Eli Vivas is a 73 y.o. year old female who is here for follow-up.    1.  Obstructive/central sleep apnea  I congratulated the patient on her excellent PAP use.  I will write her a prescription for her to get supplies from her durable medical equipment company.  I welcome the patient to follow-up with me every 2 years.  2.  Other sleep disturbance     Patient verbalized understanding of these issues, agrees with the plan and all questions were answered today. Patient was given an opportuntity to voice any other symptoms or concerns not listed above. Patient did not have any other symptoms or concerns.      Ryan Farrell DO  Board Certified in Internal Medicine and Sleep Medicine  [facility].      (Note created with Dragon voice recognition and unintended spelling errors and word substitutions may occur)       "

## 2021-06-03 ENCOUNTER — RECORDS - HEALTHEAST (OUTPATIENT)
Dept: ADMINISTRATIVE | Facility: CLINIC | Age: 76
End: 2021-06-03

## 2021-06-03 VITALS — WEIGHT: 223.3 LBS | BODY MASS INDEX: 42.19 KG/M2

## 2021-06-03 VITALS
DIASTOLIC BLOOD PRESSURE: 95 MMHG | BODY MASS INDEX: 42.1 KG/M2 | HEIGHT: 61 IN | SYSTOLIC BLOOD PRESSURE: 181 MMHG | OXYGEN SATURATION: 96 % | HEART RATE: 70 BPM | WEIGHT: 223 LBS

## 2021-06-04 VITALS
WEIGHT: 222.5 LBS | HEIGHT: 61 IN | DIASTOLIC BLOOD PRESSURE: 74 MMHG | SYSTOLIC BLOOD PRESSURE: 157 MMHG | BODY MASS INDEX: 42.01 KG/M2 | HEART RATE: 66 BPM

## 2021-06-04 VITALS
OXYGEN SATURATION: 95 % | WEIGHT: 220.25 LBS | HEART RATE: 66 BPM | RESPIRATION RATE: 14 BRPM | SYSTOLIC BLOOD PRESSURE: 112 MMHG | DIASTOLIC BLOOD PRESSURE: 74 MMHG | BODY MASS INDEX: 41.62 KG/M2

## 2021-06-04 VITALS — BODY MASS INDEX: 41.58 KG/M2 | WEIGHT: 220.25 LBS | HEIGHT: 61 IN

## 2021-06-08 NOTE — PROGRESS NOTES
Order for Durable Medical Equipment was processed and equipment ordered.   DME provider: [facility]  Date Faxed: 01/06/17  Ordering Provider: HUYEN  Equipment ordered: CPAP

## 2021-06-08 NOTE — PROGRESS NOTES
Dear Dr. Israel Chapman MD  [address] [place] [id number],    Thank you for the opportunity to participate in the care of Eli Vivas.     She is a 71 y.o.  female patient who comes to the sleep medicine clinic for review of her sleep study. The study was completed on 11/12/2016 which showed the the patient had patient had mild obstructive sleep apnea with an apnea hypopnea index of 13.2 events per hour with a low-dose O2 sat of 86%.  I invited the patient to return to get a titration study on 12/22/2016 which showed that the patient also had some central sleep apnea that was optimally treated with ASV.  Patient also was found to have sleep-related hypoxia on the night of the sleep study.  I reviewed the results of both sleep study with the patient in detail today.  I also reviewed the patient's echocardiogram which showed that her ejection fraction was 60%.    Past Medical History   Diagnosis Date     Breast cancer 2011     GERD (gastroesophageal reflux disease)      Hx of radiation therapy 2011     Hypertension      Hypothyroidism        Past Surgical History   Procedure Laterality Date     Cataract extraction Bilateral      Breast lumpectomy Right 2011     Breast biopsy Right        Social History     Social History     Marital status: Single     Spouse name: N/A     Number of children: N/A     Years of education: N/A     Occupational History     Not on file.     Social History Main Topics     Smoking status: Never Smoker     Smokeless tobacco: Never Used     Alcohol use No     Drug use: No     Sexual activity: No     Other Topics Concern     Not on file     Social History Narrative       Current Outpatient Prescriptions   Medication Sig Dispense Refill     acetaminophen (TYLENOL) 500 MG tablet Take 500 mg by mouth as needed.        ascorbic acid (ASCORBIC ACID WITH MEHDI HIPS) 500 MG tablet Take 500 mg by mouth daily.       aspirin 81 MG tablet Take 81 mg by mouth daily.       atorvastatin  "(LIPITOR) 10 MG tablet Take 1 tablet (10 mg total) by mouth bedtime. 90 tablet 3     CALCIUM CARBONATE-VITAMIN D3 ORAL Take 2 tablets by mouth daily.        ezetimibe (ZETIA) 10 mg tablet Take 1 tablet (10 mg total) by mouth daily. 90 tablet 3     fluocinonide (LIDEX) 0.05 % cream BRITTNY EXT AA BID PRN  3     GLUCOSAMINE SULFATE ORAL Take 2 tablets by mouth daily.        GUAIFENESIN/DEXTROMETHORPHAN (ROBITUSSIN DM MAX ORAL) Take by mouth as needed.       IBUPROFEN (ADVIL ORAL) Take by mouth as needed.       levothyroxine 88 mcg cap Take 88 mcg by mouth daily.       lisinopril (PRINIVIL,ZESTRIL) 5 MG tablet Take 5 mg by mouth daily.       multivitamin (MULTIVITAMIN) per tablet Take 1 tablet by mouth daily.       omeprazole (PRILOSEC) 20 MG capsule Take 20 mg by mouth daily. occ takes every day as needed       pseudoephedrine HCl (SUDAFED 24 HR) 240 mg Tb24 tablet Take 240 mg by mouth daily as needed.       PSYLLIUM SEED, WITH SUGAR, (METAMUCIL ORAL) Take 15 mL by mouth daily as needed.        verapamil 200 mg CPCT Take 200 mg by mouth daily.       No current facility-administered medications for this visit.        Allergies   Allergen Reactions     Bextra [Valdecoxib] Other (See Comments)     Leg Pain     Vioxx [Rofecoxib] Other (See Comments)     Leg Pain     Adhesive Rash     Septra [Sulfamethoxazole-Trimethoprim] Rash       Physical Exam:  Visit Vitals     /78     Ht 5' 1\" (1.549 m)     Wt 218 lb (98.9 kg)     BMI 41.19 kg/m2     BMI:Body mass index is 41.19 kg/(m^2).   GEN: NAD, obese  Head: Normocephalic.     Labs/Studies:  - We reviewed the results of the overnight PSG as described on the HPI.     Lab Results   Component Value Date    WBC 8.5 12/23/2013    HGB 13.1 12/23/2013    HCT 38.1 12/23/2013    MCV 87 12/23/2013     12/23/2013         Chemistry        Component Value Date/Time     10/07/2016 0847    K 4.2 10/07/2016 0847     10/07/2016 0847    CO2 28 10/07/2016 0847    BUN 14 " 10/07/2016 0847    CREATININE 0.75 10/07/2016 0847    GLU 97 10/07/2016 0847        Component Value Date/Time    CALCIUM 9.7 10/07/2016 0847    ALKPHOS 93 10/07/2016 0847    AST 25 10/07/2016 0847    ALT 23 10/07/2016 0847    BILITOT 0.6 10/07/2016 0847            No results found for: FERRITIN        Assessment and Plan:  In summary Eli Vivas is a 71 y.o. year old female here for review of her sleep studies.  1. Obstructive Sleep Apnea  We had an extensive conversation to review the results of her sleep study and to  her on the importance of treating sleep apnea.  Patient decided to proceed with ASV. She will start using the device as soon as she receives it with the intention to use if for the entire night. We discussed some tips to increase PAP tolerance as well as the normal curve of adaptation. ASV may provide improved respiratory function during the night but it can cause some sleep disruption that tends to improve with continuous usage. She should return to the clinic in 6 weeks to review compliance and efficacy monitoring.  We decided to proceed with [employer] as her durable medical equipment company.  2.  Central Sleep Apnea  3.  Sleep-Related Hypoxia  4.  Excessive daytime sleepiness  5.  Other sleep disturbance  6.  Obesity     Patient verbalized understanding of these issues, agrees with the plan and all questions were answered today. Patient was given an opportuntity to voice any other symptoms or concerns not listed above. Patient did not have any other symptoms or concerns.      Patient told to return in 6 weeks. Patient instructed to stop at  to schedule appointment before leaving today.    Ryan Farrell DO  Board Certified in Internal Medicine and Sleep Medicine  [facility].    We spent a total of 25 minutes of face-to-face encounter and more than 50% of the encounter was used for counseling or coordination of care.    (Note created with Dragon voice  recognition and unintended spelling errors and word substitutions may occur)

## 2021-06-09 NOTE — PROGRESS NOTES
Dear Dr. Israel Chapman MD  [address] [place] [id number],    Thank you for the opportunity to participate in the care of Eli Vivas.     She is a 71 y.o. y/o female patient who comes to the sleep medicine clinic for follow up.  This is her first clinical visit since starting ASV pressure therapy.  She states that she no longer has any sleep maintenance insomnia.  She is able to go to sleep at 9 at night and wake up at 6 AM in the morning.  She is very happy about this.  She still occasionally feels tired during the day and has not follow through with my recommendation to nap with the machine.  She also complained of extreme desiccation of her mouth upon using pressure therapy. I recommended that she use Biotin mouthwash and add a chin strap. She rates her overall pressure therapy experience as a positive one.    Compliance Download data for 30 Days:  Pressure setting:ASV  Residual AHI:20.9  Leak:Mininal  Compliance:100%  Mask Tolerance:Good  Skin irritation:None    Past Medical History:   Diagnosis Date     Breast cancer 2011     GERD (gastroesophageal reflux disease)      Hx of radiation therapy 2011     Hypertension      Hypothyroidism        Past Surgical History:   Procedure Laterality Date     BREAST BIOPSY Right      BREAST LUMPECTOMY Right 2011     CATARACT EXTRACTION Bilateral        Social History     Social History     Marital status: Single     Spouse name: N/A     Number of children: N/A     Years of education: N/A     Occupational History     Not on file.     Social History Main Topics     Smoking status: Never Smoker     Smokeless tobacco: Never Used     Alcohol use No     Drug use: No     Sexual activity: No     Other Topics Concern     Not on file     Social History Narrative       Current Outpatient Prescriptions   Medication Sig Dispense Refill     acetaminophen (TYLENOL) 500 MG tablet Take 500 mg by mouth as needed.        ascorbic acid (ASCORBIC ACID WITH MEHDI HIPS) 500  "MG tablet Take 500 mg by mouth daily.       aspirin 81 MG tablet Take 81 mg by mouth daily.       atorvastatin (LIPITOR) 10 MG tablet TAKE ONE TABLET BY MOUTH ONCE DAILY AT BEDTIME 90 tablet 0     CALCIUM CARBONATE-VITAMIN D3 ORAL Take 2 tablets by mouth daily.        fluocinonide (LIDEX) 0.05 % cream BRITTNY EXT AA BID PRN  3     GLUCOSAMINE SULFATE ORAL Take 2 tablets by mouth daily.        GUAIFENESIN/DEXTROMETHORPHAN (ROBITUSSIN DM MAX ORAL) Take by mouth as needed.       IBUPROFEN (ADVIL ORAL) Take by mouth as needed.       levothyroxine 88 mcg cap Take 88 mcg by mouth daily.       lisinopril (PRINIVIL,ZESTRIL) 5 MG tablet Take 5 mg by mouth daily.       multivitamin (MULTIVITAMIN) per tablet Take 1 tablet by mouth daily.       omeprazole (PRILOSEC) 20 MG capsule Take 20 mg by mouth daily. occ takes every day as needed       pseudoephedrine HCl (SUDAFED 24 HR) 240 mg Tb24 tablet Take 240 mg by mouth daily as needed.       PSYLLIUM SEED, WITH SUGAR, (METAMUCIL ORAL) Take 15 mL by mouth daily as needed.        verapamil 200 mg CPCT Take 200 mg by mouth daily.       No current facility-administered medications for this visit.        Allergies   Allergen Reactions     Bextra [Valdecoxib] Other (See Comments)     Leg Pain     Vioxx [Rofecoxib] Other (See Comments)     Leg Pain     Adhesive Rash     Septra [Sulfamethoxazole-Trimethoprim] Rash       Physical Exam:  /78  Pulse 64  Ht 5' 1\" (1.549 m)  Wt 215 lb (97.5 kg)  SpO2 97%  BMI 40.62 kg/m2  BMI:Body mass index is 40.62 kg/(m^2).   GEN: NAD, obese  Psych: normal mood, normal affect      Labs/Studies:     I reviewed the efficacy and compliance report from his device. Data summarized on the HPI and the CPAP compliance flow sheet.     Lab Results   Component Value Date    WBC 8.5 12/23/2013    HGB 13.1 12/23/2013    HCT 38.1 12/23/2013    MCV 87 12/23/2013     12/23/2013         Chemistry        Component Value Date/Time     10/07/2016 0847    K " 4.2 10/07/2016 0847     10/07/2016 0847    CO2 28 10/07/2016 0847    BUN 14 10/07/2016 0847    CREATININE 0.75 10/07/2016 0847    GLU 97 10/07/2016 0847        Component Value Date/Time    CALCIUM 9.7 10/07/2016 0847    ALKPHOS 93 10/07/2016 0847    AST 25 10/07/2016 0847    ALT 23 10/07/2016 0847    BILITOT 0.6 10/07/2016 0847            No results found for: FERRITIN         Assessment and Plan:  In summary Eli Vivas is a 71 y.o. year old female who is here for compliance download review.    1. Obstructive Sleep Apnea/Central Sleep Apnea/Sleep related Hypoxia  Since the patient's AHI is elevated, I will lower the patient's min PS to zero and raise her min EPAP to 6 cwp. I will also give the patient a handout on how to properly maintain her equipment.  2.Hypersomnia  Improved  3.Fatigue    We counseled the patient on the importannce of using her CPAP device every night and the risks of not treating sleep apnea.      Patient verbalized understanding of these issues, agrees with the plan and all questions were answered today. Patient was given an opportuntity to voice any other symptoms or concerns not listed above. Patient did not have any other symptoms or concerns.      Patient told to return in 3 months. Patient instructed to stop at  to schedule appointment before leaving today.    Ryan Farrell DO  Board Certified in Internal Medicine and Sleep Medicine  [facility].    I spent a total of 40 minutes of face-to-face encounter and more than 50% of the encounter was used for counseling or coordination of care.    (Note created with Dragon voice recognition and unintended spelling errors and word substitutions may occur)

## 2021-06-10 NOTE — PROGRESS NOTES
CTA done per order.  Rhythm SB 50's.  Tolerated procedure well. Instructed to increase fluids throughout the day.  Interpretation pending.

## 2021-06-10 NOTE — PROGRESS NOTES
[facility] Hematology and Oncology Progress Note    Patient: Eli Vivas  MRN: [id number]  Date of Service: 08/04/2020        Reason for Visit    Chief Complaint   Patient presents with     HE Cancer     Malignant neoplasm of upper-outer quadrant of right breast in female, estrogen receptor positive       Assessment and Plan  Cancer Staging  Breast cancer of upper-outer quadrant of right female breast (H)  Staging form: Breast, AJCC 7th Edition  - Clinical: No stage assigned - Unsigned  - Pathologic: Stage IA (T1c, N0, cM0, Free text: ER+/NE+/HER2 -ve) - Signed by Brett Grijalva MD on 8/13/2014    1. A 71 y.o. woman postmenopausal CA breast, right side, T1 N0 M0, stage I, ER/NE positive, HER-2/breana negative, status post lumpectomy, radiation and 2 years of tamoxifen and then was switched to Arimidex since December 2013. I believe she started hormonal therapy around November 2011 and finished in November of 2016. She is feeling great. No clinical signs of recurrence. She will get her mammogram in November and see DR. Grijalva in one year.     2. Osteopenia: High risk for worsening bone density with the aromatase inhibitor. She is done with that so we want to make sure she is stable.  She will continue calcium and vitamin D supplement. We will repeat her DEXA scan now.  Encourage also to participate in weightbearing exercises. Good calcium in diet.       ECOG Performance   ECOG Performance Status: 1     Distress Assessment  Distress Assessment Score: No distress    Pain  Currently in Pain: Yes  Pain Score (Initial OR Reassessment): 4  Location: mid back      Problem List    1. Osteoporosis without current pathological fracture, unspecified osteoporosis type  DXA Bone Density Scan    DXA Bone Density Scan   2. Malignant neoplasm of upper-outer quadrant of right breast in female, estrogen receptor positive (H)  Mammo Screening Bilateral   3. Encounter for screening mammogram for malignant neoplasm of breast   Mammo  Screening Bilateral      ______________________________________________________________________________    History of Present Illness    Eli Vivas is a very pleasant 74 y.o. female with a history of breast cancer diagnosed in October of 2011 located on the right side, measuring 1.3 cm in size, present on a mammogram, ER/CA positive, HER-2/breana negative, grade 1 for which she underwent radiation therapy and took tamoxifen November of 2011. Subsequent to that, after that we switched her to Arimidex. She took that until November of 2016 and then stopped since she took endocrine therapy for 5 years. She is feeling great.     Pain Status  Currently in Pain: Yes    Review of Systems    Constitutional  Constitutional (WDL): Exceptions to WDL  Hot flashes/Night Sweats: Concerns(mild sweats)  Neurosensory  Neurosensory (WDL): Exceptions to WDL  Peripheral Motor Neuropathy: Concerns  Peripheral Sensory Neuropathy: Concerns  Eye   Eye Disorder (WDL): Exceptions to WDL(upcoming appt with Dr Block in September)  Blurred Vision: Concerns(worsening)  Ear  Ear Disorder (WDL): All ear disorder elements are within defined limits  Cardiovascular  Cardiovascular (WDL): All cardiovascular elements are within defined limits  Pulmonary  Respiratory (WDL): Exceptions to WDL  Dyspnea: Concerns(with exertion)  Gastrointestinal  Gastrointestinal (WDL): Exceptions to WDL  Constipation: Concerns(chronic, controlled with metamucil prn)  Genitourinary  Genitourinary (WDL): All genitourinary elements are within defined limits  Lymphatic  Lymph (WDL): All lymph disorder elements are within defined limits  Musculoskeletal and Connective Tissue  Musculoskeletal and Connetive Tissue Disorders (WDL): Exceptions to WDL  Arthralgia: Concerns  Myalgia: Concerns  Integumentary  Integumentary (WDL): All integumentary elements are within defined limits  Patient Coping  Patient Coping: Accepting;Open/discussion  Accompanied by  Accompanied by:  "Alone  Oral Chemo Adherence         Past History  Past Medical History:   Diagnosis Date     Breast cancer (H) 2011    ca     Chronic kidney disease      GERD (gastroesophageal reflux disease)      Hx of radiation therapy 2011     Hypertension      Hypothyroidism      Sleep apnea        PHYSICAL EXAM:  /74   Pulse 66   Ht 5' 1\" (1.549 m)   Wt (!) 222 lb 8 oz (100.9 kg)   BMI 42.04 kg/m    GENERAL: no acute distress. Cooperative in conversation. Here alone  RESP: regular RR. No expiratory wheezing.   NEURO: non focal. Alert and oriented x3.   PSYCH: within normal limits. No depression or anxiety.  SKIN: warm dry intact   LYMPH: no cervical, supraclavicular or axillary lymphadenopathy  BREAST: Bilateral exam done.  Lumpectomy incision is well-healed.  Hard area of scarring near incision. Mildly tender. Has been there for awhile per pt.  No abnormal lesions or rashes noted.  No evidence for local recurrence bilaterally.        Lab Results    No results found for this or any previous visit (from the past 168 hour(s)).    Imaging    No results found.      Signed by: Adelaida Mercado CNP  "

## 2021-06-10 NOTE — PROGRESS NOTES
Patient is here for provider visit for Malignant neoplasm of upper-outer quadrant of right breast in female, estrogen receptor positive.

## 2021-06-10 NOTE — PROGRESS NOTES
[facility] Hematology and Oncology Progress Note    Patient: Eli Vivas  MRN: [id number]  Date of Service: 04/18/2017        Reason for Visit    Chief Complaint   Patient presents with     HE Cancer     Breast Cancer       Assessment and Plan  Breast cancer of upper-outer quadrant of right female breast    Staging form: Breast, AJCC 7th Edition      Clinical: No stage assigned - Unsigned      Pathologic: Stage IA (T1c, N0, cM0, Free text: ER+/AL+/HER2 -ve) - Signed by Brett Grijalva MD on 8/13/2014    1. A 71 y.o. woman postmenopausal CA breast, right side, T1 N0 M0, stage I, ER/AL positive, HER-2/breana negative, status post lumpectomy, radiation and 2 years of tamoxifen and then was switched to Arimidex since December 2013. I believe she started hormonal therapy around November 2011 and finished in November of 2016. She is feeling great. No clinical signs of recurrence. She will get her mammogram in October and see DR. Grijalva in one year.     ECOG Performance   ECOG Performance Status: 0     Distress Assessment  Distress Assessment Score: No distress    Pain  Currently in Pain: Yes  Pain Score (Initial OR Reassessment): 1  Location: back      Problem List    1. Breast cancer of upper-outer quadrant of right female breast  Mammo Screening Bilateral    Comprehensive Metabolic Panel   2. Encounter for screening mammogram for malignant neoplasm of breast   Mammo Screening Bilateral      ______________________________________________________________________________    History of Present Illness    Eli Vivas is a very pleasant 69 y.o. old female with a history of breast cancer diagnosed in October of 2011 located on the right side, measuring 1.3 cm in size, present on a mammogram, ER/AL positive, HER-2/breana negative, grade 1 for which she underwent radiation therapy and took tamoxifen November of 2011. Subsequent to that, after that we switched her to Arimidex. She took that until November of 2016 and then  stopped since she took endocrine therapy for 5 years. She is feeling great.     Pain Status  Currently in Pain: Yes    Review of Systems    Constitutional  Constitutional (WDL): Exceptions to WDL  Fatigue: Fatigue relieved by rest (sleep apnea)  Neurosensory  Neurosensory (WDL): Exceptions to WDL  Peripheral Sensory Neuropathy: Asymptomatic, loss of deep tendon reflexes or paresthesia (hands)  Eye   Eye Disorder (WDL): All eye disorder elements are within defined limits  Ear  Ear Disorder (WDL): All ear disorder elements are within defined limits  Cardiovascular  Cardiovascular (WDL): Exceptions to WDL  Edema: Yes (LT>RT feet and ankles)  Pulmonary  Respiratory (WDL): Exceptions to WDL  Dyspnea: Shortness of breath with moderate exertion  Gastrointestinal  Gastrointestinal (WDL): Exceptions to WDL (GERD)  Constipation: Persistent symptoms with regular use of laxatives or enemas, limiting instrumental ADL  Dry Mouth: Symptomatic (e.g., dry or thick saliva) without significant dietary alteration, unstimulated saliva flow >0.2 ml/min  Genitourinary  Genitourinary (WDL): All genitourinary elements are within defined limits (some stress incontinence)  Lymphatic  Lymph (WDL): All lymph disorder elements are within defined limits  Musculoskeletal and Connective Tissue  Musculoskeletal and Connetive Tissue Disorders (WDL): Exceptions to WDL  Arthralgia: Mild pain (arthritis)  Integumentary  Integumentary (WDL): All integumentary elements are within defined limits  Patient Coping  Patient Coping: Accepting  Distress Assessment  Distress Assessment Score: No distress  Accompanied by  Accompanied by: Alone  Oral Chemo Adherence       Past History  Past Medical History:   Diagnosis Date     Breast cancer 2011     GERD (gastroesophageal reflux disease)      Hx of radiation therapy 2011     Hypertension      Hypothyroidism      Sleep apnea        PHYSICAL EXAM:  /61  Pulse 61  Temp 98.4  F (36.9  C) (Oral)   Wt 214 lb  3.2 oz (97.2 kg)  SpO2 96%  BMI 40.47 kg/m2  GENERAL: no acute distress. Cooperative in conversation. Here alone  HEENT: pupils are equal, round and reactive. Oromucosa is clean and intact. No ulcerations or mucositis noted. No bleeding noted.  RESP: lungs are clear bilaterally per auscultation. Regular respiratory rate. No wheezes or rhonchi.  CV: Regular, rate and rhythm. No murmurs.  ABD: soft, nontender. Positive bowel sounds. No organomegaly.   MUSCULOSKELETAL: No lower extremity swelling.   NEURO: non focal. Alert and oriented x3.   PSYCH: within normal limits. No depression or anxiety.  SKIN: warm dry intact   LYMPH: no cervical, supraclavicular or axillary lymphadenopathy  BREAST: Bilateral exam done.  Lumpectomy incision is well-healed.  Very slight scarring.  No abnormal lesions or rashes noted.  No evidence for local recurrence bilaterally.        Lab Results    Recent Results (from the past 168 hour(s))   Comprehensive Metabolic Panel   Result Value Ref Range    Sodium 142 136 - 145 mmol/L    Potassium 4.1 3.5 - 5.0 mmol/L    Chloride 107 98 - 107 mmol/L    CO2 27 22 - 31 mmol/L    Anion Gap, Calculation 8 5 - 18 mmol/L    Glucose 96 70 - 125 mg/dL    BUN 12 8 - 28 mg/dL    Creatinine 0.76 0.60 - 1.10 mg/dL    GFR MDRD Af Amer >60 >60 mL/min/1.73m2    GFR MDRD Non Af Amer >60 >60 mL/min/1.73m2    Bilirubin, Total 0.6 0.0 - 1.0 mg/dL    Calcium 9.9 8.5 - 10.5 mg/dL    Protein, Total 7.0 6.0 - 8.0 g/dL    Albumin 3.6 3.5 - 5.0 g/dL    Alkaline Phosphatase 83 45 - 120 U/L    AST 23 0 - 40 U/L    ALT 22 0 - 45 U/L       Imaging    No results found.      Signed by: Adelaida Mercado, CNP

## 2021-06-11 NOTE — TELEPHONE ENCOUNTER
Call placed and brief message left letting Eli know that we have bone density scan results to cover with her that have been reviewed by Adelaida Mercado CNP.  When she calls back, she will be told that the scan still shows osteopenia but it has improved a little from her last one.  No need for any changes.  Likely repeat in 3-5 years now that she is off of breast cancer medication.    Will await a return call.    Shila Millard RN

## 2021-06-11 NOTE — TELEPHONE ENCOUNTER
Patient called back and was given the below information.  She was happy to hear.  She is aware that she is due in August 2021 for her next appt in our clinic.    Shila Millard RN

## 2021-06-11 NOTE — PROGRESS NOTES
Dear Dr. Israel Chapman MD  No address on file,    Thank you for the opportunity to participate in the care of Eli Vivas.     She is a 71 y.o. y/o female patient who comes to the sleep medicine clinic for follow up.  Since the patient's last clinical visit with me, she states that her sleep quality has dramatically improved and she does feel as if she has more energy during the day. She also reports being able to sleep at least 6 hours before needing to get up to urinate compared to before. She is still suffering from a little dessication but will contact [facility] to get a chin strap    Compliance Download data for 30 Days:  Pressure setting: ASV  Residual AHI:15.8 events per hour  Leak:Minimal  Compliance:100%  Mask Tolerance:Good  Skin irritation:None      Past Medical History:   Diagnosis Date     Breast cancer 2011     Chronic kidney disease      GERD (gastroesophageal reflux disease)      Hx of radiation therapy 2011     Hypertension      Hypothyroidism      Sleep apnea        Past Surgical History:   Procedure Laterality Date     BREAST BIOPSY Right      BREAST LUMPECTOMY Right 2011     CATARACT EXTRACTION Bilateral        Social History     Social History     Marital status: Single     Spouse name: N/A     Number of children: N/A     Years of education: N/A     Occupational History     Not on file.     Social History Main Topics     Smoking status: Never Smoker     Smokeless tobacco: Never Used     Alcohol use No     Drug use: No     Sexual activity: No     Other Topics Concern     Not on file     Social History Narrative       Current Outpatient Prescriptions   Medication Sig Dispense Refill     acetaminophen (TYLENOL) 500 MG tablet Take 500 mg by mouth as needed.        ascorbic acid (ASCORBIC ACID WITH MEHDI HIPS) 500 MG tablet Take 500 mg by mouth daily.       aspirin 81 MG tablet Take 81 mg by mouth daily.       atorvastatin (LIPITOR) 10 MG tablet TAKE ONE TABLET BY MOUTH ONCE DAILY AT BEDTIME 90  "tablet 0     CALCIUM CARBONATE-VITAMIN D3 ORAL Take 1 tablet by mouth 2 (two) times a day.        cholecalciferol, vitamin D3, (VITAMIN D3) 2,000 unit cap Take 1 capsule by mouth daily.       fluocinonide (LIDEX) 0.05 % cream BRITTNY EXT AA BID PRN  3     GLUCOSAMINE SULFATE ORAL Take 2 tablets by mouth daily.        GUAIFENESIN/DEXTROMETHORPHAN (ROBITUSSIN DM MAX ORAL) Take by mouth as needed.       IBUPROFEN (ADVIL ORAL) Take by mouth as needed.       levothyroxine 88 mcg cap Take 88 mcg by mouth daily.       lisinopril (PRINIVIL,ZESTRIL) 5 MG tablet Take 5 mg by mouth daily.       multivitamin (MULTIVITAMIN) per tablet Take 1 tablet by mouth daily.       omeprazole (PRILOSEC) 20 MG capsule Take 20 mg by mouth daily.        pseudoephedrine HCl (SUDAFED 24 HR) 240 mg Tb24 tablet Take 240 mg by mouth daily as needed.       PSYLLIUM SEED, WITH SUGAR, (METAMUCIL ORAL) Take 15 mL by mouth daily as needed.        verapamil 200 mg CPCT Take 200 mg by mouth daily.       No current facility-administered medications for this visit.        Allergies   Allergen Reactions     Bextra [Valdecoxib] Other (See Comments)     Leg Pain     Vioxx [Rofecoxib] Other (See Comments)     Leg Pain     Adhesive Rash     Septra [Sulfamethoxazole-Trimethoprim] Rash       Physical Exam:  Pulse 74  Ht 5' 1\" (1.549 m)  Wt 218 lb 14.4 oz (99.3 kg)  SpO2 96%  BMI 41.36 kg/m2  BMI:Body mass index is 41.36 kg/(m^2).   GEN: NAD, obese  Psych: normal mood, normal affect    Labs/Studies:     I reviewed the efficacy and compliance report from his device. Data summarized on the HPI and the ASV compliance flow sheet.     Lab Results   Component Value Date    WBC 8.5 12/23/2013    HGB 13.1 12/23/2013    HCT 38.1 12/23/2013    MCV 87 12/23/2013     12/23/2013         Chemistry        Component Value Date/Time     04/18/2017 1315    K 4.1 04/18/2017 1315     04/18/2017 1315    CO2 27 04/18/2017 1315    BUN 12 04/18/2017 1315    CREATININE " 0.76 04/18/2017 1315    GLU 96 04/18/2017 1315        Component Value Date/Time    CALCIUM 9.9 04/18/2017 1315    ALKPHOS 83 04/18/2017 1315    AST 23 04/18/2017 1315    ALT 22 04/18/2017 1315    BILITOT 0.6 04/18/2017 1315            No results found for: FERRITIN         Assessment and Plan:  In summary Eli Vivas is a 71 y.o. year old female who is here for review of her compliance download.    1. Obstructive Sleep Apnea/Central Sleep Apnea  I congratulated the patient on her excellent usage. I will keep her on the same pressure range and requested that she also consider using Biotin mouthwash.  2. Hypersomnia  Resolved  3. Other Sleep disturbance     Patient verbalized understanding of these issues, agrees with the plan and all questions were answered today. Patient was given an opportuntity to voice any other symptoms or concerns not listed above. Patient did not have any other symptoms or concerns.      Patient told to return in 12 months. Patient instructed to stop at  to schedule appointment before leaving today.    Ryan Farrell DO  Board Certified in Internal Medicine and Sleep Medicine  [facility].    I spent a total of 15 minutes of face-to-face encounter and more than 50% of the encounter was used for counseling or coordination of care.    (Note created with Dragon voice recognition and unintended spelling errors and word substitutions may occur)

## 2021-06-11 NOTE — TELEPHONE ENCOUNTER
Another brief message left on patients line asking her to call back to go over bone density scan results.    Shila Millard RN

## 2021-06-14 NOTE — TELEPHONE ENCOUNTER
Call from Eli, she would like her 2020 mammogram & bone density reports faxed to Dr. Kapoor at [facility].  At a recent primary care visit the doctor didn't have these reports.  She gave me the clinic phone number [phone] to get the fax number.  Informed Eli that writer would do this today.    Called clinic, requested the direct clinic fax [phone].  Successfully faxed 6 pages/both reports with Attn: Dr. Kapoor, reports for review per patient TIFFANY Vivas.

## 2021-06-17 NOTE — PROGRESS NOTES
[facility] Progress Note    Patient: Eli Vivas  MRN: [id number]  Date of Service: 4/18/2018        Reason for visit      1. Malignant neoplasm of upper-outer quadrant of right breast in female, estrogen receptor positive        Assessment     1. A 72 y.o.  woman postmenopausal CA breast, right side, T1 N0 M0, stage I, ER/IL positive, HER-2/breana negative, status post lumpectomy, radiation  and 2 years of tamoxifen on Arimidex since December 2013.  Her performance status is 1.  2.   Mild osteopenia.  3.   Normal endometrial thickening and no more dysfunctional uterine bleeding.  4.   Some pain from tendonitis left hand.    Plan     1. Continue observation. Mammogram in October.  2. Follow-up with me in 12 months time.  3. High calcium diet and vit d supplement.  4. F/u with Dr. Kapoor  for other medical issues.    Clinical stage      Breast cancer of upper-outer quadrant of right female breast    Primary site: Breast (Right)    Staging method: AJCC 7th Edition    Pathologic free text: ER+/IL+/HER2 -ve    Pathologic: Stage IA (T1c, N0, cM0) - Signed by Brett Grijalva MD on 8/13/2014    Summary: Stage IA (T1c, N0, cM0)      History     Eli Vivas is a very pleasant 72 y.o. old female with a history of breast cancer diagnosed in October of 2011 located on the right side, measuring 1.3 cm in size, present on a mammogram, ER/IL positive, HER-2/breana negative, grade 1 for which she underwent radiation therapy and took tamoxifen until November of 2011.  Subsequent to that, after that we switched her to Arimidex. She continues to have some bone and joint side effects. She did try stopping Arimidex for a week but didn't really make much difference. In April 2014 she did have some spotting and then she had what she calls a menstrual period and then had it again in May 2014. She was evaluated by Dr. Kline from [facility] and she has been told she has thickened endometrium.  She has not had any  problems since then. Finished 5 years of AI therapy in October 2016.    Comes in today for her annual appointment. No new issues.    Past Medical History     Past Medical History:   Diagnosis Date     Breast cancer 2011     Chronic kidney disease      GERD (gastroesophageal reflux disease)      Hx of radiation therapy 2011     Hypertension      Hypothyroidism      Sleep apnea        Review of Systems   Constitutional  Constitutional (WDL): All constitutional elements are within defined limits  Neurosensory  Neurosensory (WDL): All neurosensory elements are within defined limits  Cardiovascular  Cardiovascular (WDL): Exceptions to WDL  Edema: Yes  Edema Limbs: 5 - 10% inter-limb discrepancy in volume or circumference at point of greatest visible difference, swelling or obscuration of anatomic architecture on close inspection (feet)  Pulmonary  Respiratory (WDL): Exceptions to WDL  Dyspnea: Shortness of breath with moderate exertion  Gastrointestinal  Gastrointestinal (WDL): Exceptions to WDL  Esophagitis: Asymptomatic, clinical or diagnostic observations only, intervention not indicated (acid reflex)  Dry Mouth: Symptomatic (e.g., dry or thick saliva) without significant dietary alteration, unstimulated saliva flow >0.2 ml/min  Genitourinary  Genitourinary (WDL): All genitourinary elements are within defined limits  Integumentary  Integumentary (WDL): All integumentary elements are within defined limits  Patient Coping  Patient Coping: Accepting  Accompanied by  Accompanied by: Alone    ECOG performance status and Distress Assessment      ECOG Performance:    ECOG Performance Status: 0    Distress Assessment  Distress Assessment Score: No distress:     Pain Status  Currently in Pain: No/denies        Vital Signs     Vitals:    04/18/18 1410   BP: 150/68   Pulse: 75   Temp: 98.4  F (36.9  C)   SpO2: 98%       Physical Exam     GENERAL: No acute distress. Cooperative in conversation.   HEENT: Pupils are equal, round  and reactive. Oral mucosa is clean and intact. No ulcerations or mucositis noted. No bleeding noted.  RESP:Chest symmetric lungs are clear bilaterally per auscultation. Regular respiratory rate. No wheezes or rhonchi.  CV: Normal S1 S2 Regular, rate and rhythm. No murmurs.  ABD: Nondistended, soft, nontender. Positive bowel sounds. No organomegaly.   EXTREMITIES: No lower extremity edema.   NEURO: Non- focal. Alert and oriented x3.  Cranial nerves appear intact.  PSYCH: Within normal limits. No depression or anxiety.  SKIN: Warm dry intact.    LYMPH NODES: Bilateral cervical, supraclavicular, axillary lymph node examination was done.  Negative for any palpable adenopathy.  BREAST: Post lumpectomy changes in right breast. She does have induration around the scar upper inner quadrant of the breast due to mammosite. Left breast is normal. No nipple discharge in either breast.      Lab Results     Results for orders placed or performed in visit on 04/18/18   Comprehensive Metabolic Panel   Result Value Ref Range    Sodium 148 (H) 136 - 145 mmol/L    Potassium 4.3 3.5 - 5.0 mmol/L    Chloride 108 (H) 98 - 107 mmol/L    CO2 29 22 - 31 mmol/L    Anion Gap, Calculation 11 5 - 18 mmol/L    Glucose 121 70 - 125 mg/dL    BUN 14 8 - 28 mg/dL    Creatinine 0.82 0.60 - 1.10 mg/dL    GFR MDRD Af Amer >60 >60 mL/min/1.73m2    GFR MDRD Non Af Amer >60 >60 mL/min/1.73m2    Bilirubin, Total 0.6 0.0 - 1.0 mg/dL    Calcium 9.8 8.5 - 10.5 mg/dL    Protein, Total 7.3 6.0 - 8.0 g/dL    Albumin 3.6 3.5 - 5.0 g/dL    Alkaline Phosphatase 84 45 - 120 U/L    AST 21 0 - 40 U/L    ALT 18 0 - 45 U/L         Imaging Results     No results found.      Brett Grijalva MD

## 2021-06-20 NOTE — LETTER
Letter by Brett Grijalva MD at      Author: Brett Grijalva MD Service: -- Author Type: --    Filed:  Encounter Date: 7/23/2020 Status: (Other)                   Office Hours: Monday - Friday 8:00 - 4:30PM    Eli Vivas  [address]  [place] [address]           July 23, 2020      Dear Eli:    Our clinic records indicate we have attempted to contact you three (3) or more times to schedule a follow up appointment. Unfortunately, we have been unable to reach you. To prevent further delays in your care, please contact our office to schedule your appointment.         Sincerely,      [facility]

## 2021-06-25 NOTE — PROGRESS NOTES
Progress Notes by Marybeth Floyd MD at 4/5/2017 10:10 AM     Author: Marybeth Floyd MD Service: -- Author Type: Physician    Filed: 4/5/2017 11:14 AM Encounter Date: 4/5/2017 Status: Signed    : Marybeth Floyd MD (Physician)                  [email or web address]           Thank you Dr. Chapman for asking the [facility] team to participate in the care of your patient, Eli Vivas.     Impression and Plan     1.  Possible coronary artery disease based on higher nuclear perfusion study in more distant past.  Most recent nuclear perfusion study 3 May 2016, however, revealed no evidence of infarct or ischemia and well-preserved LV systolic performance with ejection fraction of 70%.  I did discuss other options for further evaluation with Eli.  To further clarify her coronary anatomy to do feel be reasonable to pursue CT coronary angiography along with a calcium score.  In part, this would help direct how intensive to be with lipid-lowering therapy.  Patient is in agreement.    CT coronary angiogram/calcium score.     2. Hypertension. Blood pressure is very reasonable in the office today at 108/74 mmHg..     3. Dyslipidemia.  Lipid profile 7 October 2016 was quite favorable with LDL 71 mg/dL and HDL 56 mg/dL.  Patient reports muscle aches on even low dose of pravastatin.     Plan to continue ezetimibe therapy.     4. Lower extremity edema. This too has been stable/minimal.         History of Present Illness    Once again I would like to thank you again for asking me to participate in the care of your patient, Eli Vivas.  As you know, but to reiterate for my own records, Eli Vivas is a 71 y.o. female with presumed coronary disease. Specifically, she had a mildly abnormal stress perfusion study in March of 2009. This had revealed a small reversible lateral defect. Given the patient's lack of symptoms and in accordance with her wishes, we have simply  "continued with medical therapy.  Subsequent assessment by repeat nuclear perfusion imaging 3 May 2016, however, revealed no evidence of infarct or ischemia.  Ejection fraction well preserved at 70%.     In follow-up today, Eli reports no chest pain symptoms.  Her breathing is comfortable.  She denies any palpitations or lightheadedness.  She denies any recent fevers, chills, or other constitutional symptoms.  Parenthetically, patient was recently diagnosed with sleep apnea and is now undergoing treatment with CPAP/BiPAP.    Further review of systems is otherwise negative/noncontributory (medical record and 13 point review of systems reviewed as well and pertinent positives noted).         Cardiac Diagnostics      Echocardiogram 29 September 2011:  1. Borderline left ventricular enlargement with normal left ventricular systolic performance.  Ejection fraction 65%.  2. Mild concentric increase in left ventricular wall thickness.  3. Mild aortic stenosis.  4. Mild to moderate aortic insufficiency.  5. Borderline aortic root enlargement.  6. Normal right ventricular size and systolic performance.    Exercise nuclear perfusion study 8 June 2016:  1. No evidence of infarct or ischemia.  2. Normal left ventricular systolic performance with ejection fraction of 51%.    12-lead ECG (personally reviewed) 29 March 2017: Sinus rhythm with heart rate of 59 bpm.  Normal ECG.           Physical Examination       /82 (Patient Site: Left Arm, Patient Position: Sitting, Cuff Size: Adult Large)  Pulse 64  Resp 16  Ht 5' 1\" (1.549 m)  Wt 214 lb (97.1 kg)  BMI 40.43 kg/m2        Wt Readings from Last 3 Encounters:   04/05/17 214 lb (97.1 kg)   03/27/17 215 lb (97.5 kg)   01/06/17 218 lb (98.9 kg)     The patient is alert and oriented times three. Sclerae are anicteric. Mucosal membranes are moist. Jugular venous pressure is normal. No significant adenopathy/thyromegally appreciated. Lungs are clear with good expansion. On " cardiovascular exam, the patient has a regular S1 and S2. Abdomen is soft and non-tender. Extremities reveal no clubbing, cyanosis, with minimal edema.         Family History/Social History/Risk Factors   Patient does not smoke.  Family history of hypertension.         Medications  Allergies   Current Outpatient Prescriptions   Medication Sig Dispense Refill   ? acetaminophen (TYLENOL) 500 MG tablet Take 500 mg by mouth as needed.      ? ascorbic acid (ASCORBIC ACID WITH MEHDI HIPS) 500 MG tablet Take 500 mg by mouth daily.     ? aspirin 81 MG tablet Take 81 mg by mouth daily.     ? atorvastatin (LIPITOR) 10 MG tablet TAKE ONE TABLET BY MOUTH ONCE DAILY AT BEDTIME 90 tablet 0   ? CALCIUM CARBONATE-VITAMIN D3 ORAL Take 2 tablets by mouth daily.      ? cholecalciferol, vitamin D3, (VITAMIN D3) 2,000 unit cap Take 1 capsule by mouth daily.     ? fluocinonide (LIDEX) 0.05 % cream BRITTNY EXT AA BID PRN  3   ? GLUCOSAMINE SULFATE ORAL Take 2 tablets by mouth daily.      ? GUAIFENESIN/DEXTROMETHORPHAN (ROBITUSSIN DM MAX ORAL) Take by mouth as needed.     ? IBUPROFEN (ADVIL ORAL) Take by mouth as needed.     ? levothyroxine 88 mcg cap Take 88 mcg by mouth daily.     ? lisinopril (PRINIVIL,ZESTRIL) 5 MG tablet Take 5 mg by mouth daily.     ? multivitamin (MULTIVITAMIN) per tablet Take 1 tablet by mouth daily.     ? omeprazole (PRILOSEC) 20 MG capsule Take 20 mg by mouth daily. occ takes every day as needed     ? pseudoephedrine HCl (SUDAFED 24 HR) 240 mg Tb24 tablet Take 240 mg by mouth daily as needed.     ? PSYLLIUM SEED, WITH SUGAR, (METAMUCIL ORAL) Take 15 mL by mouth daily as needed.      ? verapamil 200 mg CPCT Take 200 mg by mouth daily.       No current facility-administered medications for this visit.       Allergies   Allergen Reactions   ? Bextra [Valdecoxib] Other (See Comments)     Leg Pain   ? Vioxx [Rofecoxib] Other (See Comments)     Leg Pain   ? Adhesive Rash   ? Septra [Sulfamethoxazole-Trimethoprim] Rash           Lab Results   Lab Results   Component Value Date     10/07/2016    K 4.2 10/07/2016     10/07/2016    CO2 28 10/07/2016    BUN 14 10/07/2016    CREATININE 0.75 10/07/2016    CALCIUM 9.7 10/07/2016     Lab Results   Component Value Date    WBC 8.5 12/23/2013    HGB 13.1 12/23/2013    HCT 38.1 12/23/2013    MCV 87 12/23/2013     12/23/2013     Lab Results   Component Value Date    CHOL 144 10/07/2016    TRIG 86 10/07/2016    HDL 56 10/07/2016    LDLCALC 71 10/07/2016     Lab Results   Component Value Date    TSH 2.43 10/07/2016

## 2021-06-28 NOTE — PROGRESS NOTES
Progress Notes by Marybeth Floyd MD at 12/4/2019  8:50 AM     Author: Marybeth Floyd MD Service: -- Author Type: Physician    Filed: 12/4/2019  9:19 AM Encounter Date: 12/4/2019 Status: Signed    : Marybeth Floyd MD (Physician)                                       Thank you Dr. Kapoor for asking the [facility] team to participate in the care of your patient, Eli Vivas.     Impression and Plan     1.  Patient was felt to have mild aortic stenosis on more distant echocardiogram.  Echocardiogram 23 May 2016, however, revealed no significant valvular heart disease.  Significant progression is not suggested on exam.  Nonetheless, it has been 2-1/2 years since last formal evaluation by imaging.    Will obtain echocardiogram.      2. Dyslipidemia.  Lipid profile 12 November 2019 was favorable with LDL 68 mg/dL and HDL 49 mg/dL.    Plan to continue atorvastatin 10 mg daily.     3. Lower extremity edema. This too has been stable/minimal.     Plan on follow-up in 1 year.           History of Present Illness    Once again I would like to thank you again for asking me to participate in the care of your patient, Eli Vivas.  As you know, but to reiterate for my own records, Eli Vivas is a 73 y.o. female with history of aortic stenosis, albeit felt mild on a prior echocardiogram.    On interview, Eli is without significant cardiovascular complaint.  She specifically denies chest pain or shortness of breath.  She denies palpitations or lightheadedness.    Further review of systems is otherwise negative/noncontributory (medical record and 13 point review of systems reviewed as well and pertinent positives noted).         Cardiac Diagnostics      Echocardiogram 23 May 2016:  1. Normal left ventricular size and systolic performance with ejection fraction of 60%.  2. No significant valvular heart disease.  3. Normal right ventricular size and systolic  performance.  4. Normal atrial dimensions.    Echocardiogram 29 September 2011:  1. Borderline left ventricular enlargement with normal left ventricular systolic performance.  Ejection fraction 65%.  2. Mild concentric increase in left ventricular wall thickness.  3. Mild aortic stenosis.  4. Mild to moderate aortic insufficiency.  5. Borderline aortic root enlargement.  6. Normal right ventricular size and systolic performance.    Exercise nuclear perfusion study 8 June 2016:  1. No evidence of infarct or ischemia.  2. Normal left ventricular systolic performance with ejection fraction of 51%.    CT coronary angiogram 20 April 2017:  1. The total Agatston score is 0.  2. Normal coronary anatomy.  No significant obstructive coronary disease.    12-lead ECG (personally reviewed) 29 March 2017: Sinus rhythm with heart rate of 59 bpm.  Normal ECG.            Physical Examination       /74 (Patient Site: Left Arm, Patient Position: Sitting, Cuff Size: Adult Regular)   Pulse 66   Resp 14   Wt 220 lb 4 oz (99.9 kg)   SpO2 95%   BMI 41.62 kg/m          Wt Readings from Last 3 Encounters:   12/04/19 220 lb 4 oz (99.9 kg)   10/10/19 (!) 223 lb (101.2 kg)   04/29/19 (!) 223 lb 4.8 oz (101.3 kg)       The patient is alert and oriented times three. Sclerae are anicteric. Mucosal membranes are moist. Jugular venous pressure is normal. No significant adenopathy/thyromegally appreciated. Lungs are clear with good expansion. On cardiovascular exam, the patient has a regular S1 and S2.  Soft systolic murmur only.  Abdomen is soft and non-tender. Extremities reveal no clubbing, cyanosis, or edema.         Family History/Social History/Risk Factors   Patient does not smoke.  Family history reviewed, and family history includes Diabetes in her paternal grandmother; Emphysema in her mother; Heart disease in her maternal grandmother.          Medications  Allergies   Current Outpatient Medications   Medication Sig Dispense  Refill   ? acetaminophen (TYLENOL) 500 MG tablet Take 500 mg by mouth as needed.      ? ascorbic acid (ASCORBIC ACID WITH MEHDI HIPS) 500 MG tablet Take 500 mg by mouth daily.     ? aspirin 81 MG tablet Take 81 mg by mouth daily.     ? atorvastatin (LIPITOR) 10 MG tablet TAKE ONE TABLET BY MOUTH ONCE DAILY AT BEDTIME 90 tablet 1   ? CALCIUM CARBONATE-VITAMIN D3 ORAL Take 1 tablet by mouth 2 (two) times a day.      ? cholecalciferol, vitamin D3, (VITAMIN D3) 2,000 unit cap Take 1 capsule by mouth daily.     ? fluocinonide (LIDEX) 0.05 % cream BRITTNY EXT AA BID PRN  3   ? GLUCOSAMINE SULFATE ORAL Take 2 tablets by mouth daily.      ? GUAIFENESIN/DEXTROMETHORPHAN (ROBITUSSIN DM MAX ORAL) Take by mouth as needed.     ? IBUPROFEN (ADVIL ORAL) Take by mouth as needed.     ? levothyroxine 88 mcg cap Take 88 mcg by mouth daily.     ? multivitamin (MULTIVITAMIN) per tablet Take 1 tablet by mouth daily.     ? omeprazole (PRILOSEC) 20 MG capsule Take 20 mg by mouth daily.      ? pseudoephedrine HCl (SUDAFED 24 HR) 240 mg Tb24 tablet Take 240 mg by mouth daily as needed.     ? PSYLLIUM SEED, WITH SUGAR, (METAMUCIL ORAL) Take 15 mL by mouth daily as needed.      ? verapamil 200 mg CPCT Take 200 mg by mouth at bedtime.              No current facility-administered medications for this visit.       Allergies   Allergen Reactions   ? Bextra [Valdecoxib] Other (See Comments)     Leg Pain   ? Vioxx [Rofecoxib] Other (See Comments)     Leg Pain   ? Adhesive Rash   ? Septra [Sulfamethoxazole-Trimethoprim] Rash          Lab Results   Lab Results   Component Value Date     11/12/2019    K 3.8 11/12/2019     (H) 11/12/2019    CO2 19 (L) 11/12/2019    BUN 11 11/12/2019    CREATININE 0.70 11/12/2019    CALCIUM 9.4 11/12/2019     Lab Results   Component Value Date    WBC 8.5 12/23/2013    HGB 13.1 12/23/2013    HCT 38.1 12/23/2013    MCV 87 12/23/2013     12/23/2013     Lab Results   Component Value Date    CHOL 137 11/12/2019     TRIG 99 11/12/2019    HDL 49 (L) 11/12/2019    LDLCALC 68 11/12/2019       Lab Results   Component Value Date    TSH 0.40 11/12/2019

## 2021-06-30 NOTE — PROGRESS NOTES
Progress Notes by Marybeth Floyd MD at 5/19/2021 10:10 AM     Author: Marybeth Floyd MD Service: -- Author Type: Physician    Filed: 5/19/2021 10:51 AM Encounter Date: 5/19/2021 Status: Signed    : Marybeth Floyd MD (Physician)                                       Thank you Dr. Kapoor for asking the [facility] team to participate in the care of your patient, Eli Vivas.     Impression and Plan     1.  Patient was felt to have mild aortic stenosis on more distant echocardiogram.  Echocardiogram 23 May 2016 and most recently 13 December 2019 did not reveal any aortic stenosis of significance    Will plan to obtain repeat echocardiogram in approximately 1 year.    2. Dyslipidemia.  Lipid profile 18 November 2020 revealed LDL 83 mg/dL and HDL 52 mg/dL.    Plan to continue atorvastatin 10 mg daily.     3. Lower extremity edema. This too has been stable.  We discussed the date the rationale for weight loss as well as sodium restriction.  She also intermittently uses compressive stockings.  Do not feel further medical treatment such as diuretics are required at this time.    4.  Hypertension.  Blood pressure somewhat elevated today.  Patient states, however, that she had a difficult time sleeping overnight and that she was concerned she might oversleep for our visit today.  I simply asked that she monitor her blood pressure at home (she has a home blood pressure monitoring device) and contact me should she have a tendency toward continued higher readings.     Plan on follow-up in 1 year.    25 minutes spent reviewing prior records (including documentation, laboratory studies, cardiac testing/imaging), interview with patient along with physical exam, planning, and subsequent documentation/crafting of note.           History of Present Illness    Once again I would like to thank you again for asking me to participate in the care of your patient, Eli Vivas.  As  you know, but to reiterate for my own records, Eli Vivas is a 75 y.o. female with history of aortic stenosis, albeit felt mild on a prior echocardiogram.     On interview, Eli is without significant cardiovascular complaint.  She specifically denies chest pain or shortness of breath.  She denies palpitations or lightheadedness.  She does have chronic lower extremity edema though this is been stable.  Denies any fevers, chills, or other constitutional symptoms.    Further review of systems is otherwise negative/noncontributory (medical record and 13 point review of systems reviewed as well and pertinent positives noted).         Cardiac Diagnostics      Echocardiogram 4 December 2019 (personally reviewed):  1. Normal left ventricular size and systolic performance with a visually estimated ejection fraction of 65-70%.   2. No significant valvular heart disease is identified on this study.   3. Normal right ventricular size and systolic performance.   4. When compared to the prior real-time echocardiogram dated 23 May 2016, there has been little appreciable interval change.     Echocardiogram 23 May 2016:  1. Normal left ventricular size and systolic performance with ejection fraction of 60%.  2. No significant valvular heart disease.  3. Normal right ventricular size and systolic performance.  4. Normal atrial dimensions.    Echocardiogram 29 September 2011:  1. Borderline left ventricular enlargement with normal left ventricular systolic performance.  Ejection fraction 65%.  2. Mild concentric increase in left ventricular wall thickness.  3. Mild aortic stenosis.  4. Mild to moderate aortic insufficiency.  5. Borderline aortic root enlargement.  6. Normal right ventricular size and systolic performance.    Exercise nuclear perfusion study 8 June 2016:  1. No evidence of infarct or ischemia.  2. Normal left ventricular systolic performance with ejection fraction of 51%.    CT coronary angiogram 20 April  2017:  1. The total Agatston score is 0.  2. Normal coronary anatomy.  No significant obstructive coronary disease.    12-lead ECG (personally reviewed) 29 March 2017: Sinus rhythm with heart rate of 59 bpm.  Normal ECG.           Physical Examination       /80 (Patient Site: Left Arm, Patient Position: Sitting, Cuff Size: Adult Large)   Pulse 64   Resp 16   Wt (!) 223 lb 9.6 oz (101.4 kg)   BMI 42.25 kg/m          Wt Readings from Last 3 Encounters:   05/19/21 (!) 223 lb 9.6 oz (101.4 kg)   08/04/20 (!) 222 lb 8 oz (100.9 kg)   12/13/19 220 lb 4 oz (99.9 kg)     The patient is alert and oriented times three. Sclerae are anicteric. Mucosal membranes are moist. Jugular venous pressure is normal. No significant adenopathy/thyromegally appreciated. Lungs are clear with good expansion. On cardiovascular exam, the patient has a regular S1 and S2. Abdomen is soft and non-tender. Extremities reveal no clubbing, cyanosis, with mild-moderate bilateral lower extremity edema.           Family History/Social History/Risk Factors   Patient does not smoke.  Family history reviewed, and family history includes Breast cancer in her maternal aunt and paternal aunt; Diabetes in her paternal grandmother; Emphysema in her mother; Heart disease in her maternal grandmother.          Medications  Allergies   Current Outpatient Medications   Medication Sig Dispense Refill   ? acetaminophen (TYLENOL) 500 MG tablet Take 500 mg by mouth as needed.      ? ascorbic acid (ASCORBIC ACID WITH MEHDI HIPS) 500 MG tablet Take 500 mg by mouth daily.     ? aspirin 81 MG tablet Take 81 mg by mouth daily.     ? atorvastatin (LIPITOR) 10 MG tablet TAKE ONE TABLET BY MOUTH ONCE DAILY AT BEDTIME 90 tablet 1   ? CALCIUM CARBONATE-VITAMIN D3 ORAL Take 1 tablet by mouth 2 (two) times a day.      ? cholecalciferol, vitamin D3, (VITAMIN D3) 2,000 unit cap Take 1 capsule by mouth daily.     ? fluocinonide (LIDEX) 0.05 % cream BRITTNY EXT AA BID PRN  3   ?  GLUCOSAMINE SULFATE ORAL Take 2 tablets by mouth daily.      ? GUAIFENESIN/DEXTROMETHORPHAN (ROBITUSSIN DM MAX ORAL) Take by mouth as needed.     ? IBUPROFEN (ADVIL ORAL) Take by mouth as needed.     ? levothyroxine 88 mcg cap Take 88 mcg by mouth daily.     ? multivitamin (MULTIVITAMIN) per tablet Take 1 tablet by mouth daily.     ? omeprazole (PRILOSEC) 20 MG capsule Take 20 mg by mouth daily.      ? pseudoephedrine HCl (SUDAFED 24 HR) 240 mg Tb24 tablet Take 240 mg by mouth daily as needed.     ? PSYLLIUM SEED, WITH SUGAR, (METAMUCIL ORAL) Take 15 mL by mouth daily as needed.      ? verapamil 200 mg CPCT Take 200 mg by mouth at bedtime.              No current facility-administered medications for this visit.       Allergies   Allergen Reactions   ? Bextra [Valdecoxib] Other (See Comments)     Leg Pain   ? Simvastatin Other (See Comments)     Muscleaches   ? Vioxx [Rofecoxib] Other (See Comments)     Leg Pain   ? Adhesive Rash   ? Septra [Sulfamethoxazole-Trimethoprim] Rash          Lab Results   Lab Results   Component Value Date     11/18/2020    K 3.8 11/18/2020     11/18/2020    CO2 25 11/18/2020    BUN 16 11/18/2020    CREATININE 0.79 11/18/2020    CALCIUM 9.4 11/18/2020     Lab Results   Component Value Date    WBC 8.5 12/23/2013    HGB 13.1 12/23/2013    HCT 38.1 12/23/2013    MCV 87 12/23/2013     12/23/2013     Lab Results   Component Value Date    CHOL 163 11/18/2020    TRIG 141 11/18/2020    HDL 52 11/18/2020    LDLCALC 83 11/18/2020     Lab Results   Component Value Date    TSH 0.47 11/18/2020

## 2021-07-13 ENCOUNTER — RECORDS - HEALTHEAST (OUTPATIENT)
Dept: ADMINISTRATIVE | Facility: CLINIC | Age: 76
End: 2021-07-13

## 2021-07-21 ENCOUNTER — RECORDS - HEALTHEAST (OUTPATIENT)
Dept: ADMINISTRATIVE | Facility: CLINIC | Age: 76
End: 2021-07-21

## 2021-07-22 ENCOUNTER — RECORDS - HEALTHEAST (OUTPATIENT)
Dept: SCHEDULING | Facility: CLINIC | Age: 76
End: 2021-07-22

## 2021-07-22 DIAGNOSIS — Z12.31 OTHER SCREENING MAMMOGRAM: ICD-10-CM

## 2021-07-23 ENCOUNTER — RECORDS - HEALTHEAST (OUTPATIENT)
Dept: MAMMOGRAPHY | Facility: HOSPITAL | Age: 76
End: 2021-07-23

## 2021-07-23 DIAGNOSIS — N63.0 LUMP OR MASS IN BREAST: ICD-10-CM

## 2021-09-16 ENCOUNTER — TELEPHONE (OUTPATIENT)
Dept: ONCOLOGY | Facility: HOSPITAL | Age: 76
End: 2021-09-16

## 2021-09-27 ENCOUNTER — TELEPHONE (OUTPATIENT)
Dept: SLEEP MEDICINE | Facility: CLINIC | Age: 76
End: 2021-09-27

## 2021-09-27 NOTE — TELEPHONE ENCOUNTER
Reason for call:  Other   Patient called regarding (reason for call): appointment  Additional comments: Patient registered her recalled bipap in early August and has not used her machine since. She would like to know if she should start use of the machine while she waits for a replacement    Phone number to reach patient:  Cell number on file:    [phone]       Best Time:  any    Can we leave a detailed message on this number?  YES    Travel screening: Negative

## 2021-09-29 NOTE — TELEPHONE ENCOUNTER
Patient called regarding [employer] recall for their pap device.     Device type: ASV    Supplemental Oxygen: No     Current Durable Medical Equipment supplier: Other [employer]     Age of your current device: less than 5 years old    Patient instructions:    Advised patient to continue using therapy until device is replaced or repaired.    Advised patient to avoid unapproved cleaning methods, such as ozone (see FDA safety communication on use of ozone ),.    Has the patient registered their device?  Yes Advised patient to register for repair or replacement on the [employer] website.  Patient can call [employer] at [phone] for additional support.    Bacterial filters to reduce exposure to particulates are sometimes cumbersome to use and are not currently recommended by the .If you choose to use a bacterial filter, consider discontinuing using humidity with the filter.     Please contact your DME to see if you are eligible to receive a new device if it is over 5 years old.    Does the patient have additional questions or concerns to be sent to the provider at this time?  No     Patient reports she does not feel comfortable using her device at this time.   She will wait until the device can be repaired or replaced.   Given recommendations for reducing MARISA.

## 2021-10-18 ENCOUNTER — ONCOLOGY VISIT (OUTPATIENT)
Dept: ONCOLOGY | Facility: HOSPITAL | Age: 76
End: 2021-10-18
Attending: INTERNAL MEDICINE
Payer: COMMERCIAL

## 2021-10-18 VITALS
WEIGHT: 222.4 LBS | HEIGHT: 61 IN | OXYGEN SATURATION: 95 % | TEMPERATURE: 98.2 F | DIASTOLIC BLOOD PRESSURE: 72 MMHG | BODY MASS INDEX: 41.99 KG/M2 | HEART RATE: 71 BPM | RESPIRATION RATE: 14 BRPM | SYSTOLIC BLOOD PRESSURE: 169 MMHG

## 2021-10-18 DIAGNOSIS — C50.411 MALIGNANT NEOPLASM OF UPPER-OUTER QUADRANT OF RIGHT BREAST IN FEMALE, ESTROGEN RECEPTOR POSITIVE (H): Primary | ICD-10-CM

## 2021-10-18 DIAGNOSIS — Z17.0 MALIGNANT NEOPLASM OF UPPER-OUTER QUADRANT OF RIGHT BREAST IN FEMALE, ESTROGEN RECEPTOR POSITIVE (H): Primary | ICD-10-CM

## 2021-10-18 PROCEDURE — G0463 HOSPITAL OUTPT CLINIC VISIT: HCPCS

## 2021-10-18 PROCEDURE — 99214 OFFICE O/P EST MOD 30 MIN: CPT | Performed by: INTERNAL MEDICINE

## 2021-10-18 ASSESSMENT — PAIN SCALES - GENERAL: PAINLEVEL: MILD PAIN (2)

## 2021-10-18 ASSESSMENT — MIFFLIN-ST. JEOR: SCORE: 1436.18

## 2021-10-18 NOTE — PROGRESS NOTES
"Oncology Rooming Note      10/18/21 10:41 AM       Eli Vivas is a 75 year old female who presents for:      Chief Complaint   Patient presents with     Oncology Clinic Visit     Breast Cancer     Breast Cancer         Initial Vitals: BP (!) 182/85 (BP Location: Left arm, Patient Position: Sitting, Cuff Size: Adult Regular)   Pulse 71   Temp 98.2  F (36.8  C) (Oral)   Resp 14   Ht 1.549 m (5' 1\")   Wt 100.9 kg (222 lb 6.4 oz)   SpO2 95%   BMI 42.02 kg/m   Estimated body mass index is 42.02 kg/m  as calculated from the following:    Height as of this encounter: 1.549 m (5' 1\").    Weight as of this encounter: 100.9 kg (222 lb 6.4 oz). Body surface area is 2.08 meters squared.      Mild Pain (2) Comment: Data Unavailable       No LMP recorded.      Allergies reviewed: Yes  Medications reviewed: Yes      Medications: Medication refills not needed today.  Pharmacy name entered into frintit: [id number] - [place], [place] - [address] AT [facility] [address]    Accompanied by: alone    Clinical concerns:  no concerns                          "

## 2021-10-18 NOTE — PROGRESS NOTES
[facility] cancer Care Progress Note  Patient: Eli Vivas   MRN:  [id number]   Date of Service : Oct 18, 2021        Reason for visit      1. Malignant neoplasm of upper-outer quadrant of right breast in female, estrogen receptor positive (H)        Assessment     1. A 75 year old   woman postmenopausal CA breast, right side, T1 N0 M0, stage I, ER/WA positive, HER-2/breana negative, status post lumpectomy, radiation  and 2 years of tamoxifen on Arimidex from December 2013 till 2016.  Her performance status is 1.  Her last mammogram in December was fine.  2.   Mild osteopenia on her bone density.  3.   Normal endometrial thickening and no more dysfunctional uterine bleeding.  4.  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  5.  Long-term Prilosec use for gastroesophageal reflux disease.  6.  Fully Covid vaccinated.    Plan     1. Continue observation. Mammogram in December. Next bone density in 2022.  2. Follow-up with me in 12 months time.  3. High calcium diet and vit d supplement.   4. F/u with Dr. Kapoor  for other medical issues.    Clinical stage      Breast cancer of upper-outer quadrant of right female breast    Primary site: Breast (Right)    Staging method: AJCC 7th Edition    Pathologic free text: ER+/WA+/HER2 -ve    Pathologic: Stage IA (T1c, N0, cM0) - Signed by Brett Grijalva MD on 8/13/2014    Summary: Stage IA (T1c, N0, cM0)      History     Eli Vivas is a very pleasant 75 year old  female with a history of breast cancer diagnosed in October of 2011 located on the right side, measuring 1.3 cm in size, present on a mammogram, ER/WA positive, HER-2/breana negative, grade 1 for which she underwent radiation therapy and took tamoxifen until November of 2011.  Subsequent to that, after that we switched her to Arimidex. She continues to have some bone and joint side effects. She did try stopping Arimidex for a week but didn't really make much difference. In April 2014 she did have some spotting  and then she had what she calls a menstrual period and then had it again in May 2014. She was evaluated by Dr. Kline from [facility] and she has been told she has thickened endometrium.  She has not had any problems since then. Finished 5 years of AI therapy in October 2016.    Comes in today for her annual appointment. No new issues. She is continuing yearly mammograms. Had bone density in September 2020 which showed slight improvement.  She did try getting off of Prilosec last year in 2018 but had significant rebound acidity that she went back on it.  She has some achy knees which prevent her from walking very much.    Past Medical History     Past Medical History:   Diagnosis Date     Breast cancer (H) 2011    ca     Chronic kidney disease      GERD (gastroesophageal reflux disease)      Hx of radiation therapy 2011     Hypertension      Hypothyroidism      Sleep apnea        Review of Systems   Constitutional  Constitutional (WDL): All constitutional elements are within defined limits  Neurosensory  Neurosensory (WDL): Exceptions to WDL  Ataxia: Asymptomatic, clinical or diagnostic observations only, intervention not indicated(Uses a cane; forgot it at home. )  Cardiovascular  Cardiovascular (WDL): Exceptions to WDL  Edema: Yes(Legs, ankles, feet. )  Pulmonary  Respiratory (WDL): Exceptions to WDL  Dyspnea: Shortness of breath with moderate exertion  Gastrointestinal  Gastrointestinal (WDL): Exceptions to WDL  Constipation: Occasional or intermittent symptoms, occasional use of stool softeners, laxatives, dietary modification, or enema  Esophagitis: Asymptomatic, clinical or diagnostic observations only, intervention not indicated(Acid refulx; on omeprazole. )  Dry Mouth: Symptomatic (e.g., dry or thick saliva) without significant dietary alteration, unstimulated saliva flow >0.2 ml/min  Genitourinary  Genitourinary (WDL): Exceptions to WDL(Some stress incontinence. )  Integumentary  Integumentary (WDL):  "All integumentary elements are within defined limits  Patient Coping  Patient Coping: Accepting  Accompanied by  Accompanied by: Alone    ECOG performance status and Distress Assessment      ECOG Performance:    ECOG Performance Status: 1    Distress Assessment  Distress Assessment Score: No distress:     Pain Status  Currently in Pain: No/denies        Vital Signs     BP (!) 169/72 (BP Location: Left arm, Patient Position: Sitting, Cuff Size: Adult Regular)   Pulse 71   Temp 98.2  F (36.8  C) (Oral)   Resp 14   Ht 1.549 m (5' 1\")   Wt 100.9 kg (222 lb 6.4 oz)   SpO2 95%   BMI 42.02 kg/m       Physical Exam     GENERAL: No acute distress. Cooperative in conversation.   HEENT: Pupils are equal, round and reactive. Oral mucosa is clean and intact. No ulcerations or mucositis noted. No bleeding noted.  RESP:Chest symmetric lungs are clear bilaterally per auscultation. Regular respiratory rate. No wheezes or rhonchi.  CV: Normal S1 S2 Regular, rate and rhythm. No murmurs.  ABD: Nondistended, soft, nontender. Positive bowel sounds. No organomegaly.   EXTREMITIES: No lower extremity edema.   NEURO: Non- focal. Alert and oriented x3.  Cranial nerves appear intact.  PSYCH: Within normal limits. No depression or anxiety.  SKIN: Warm dry intact.    LYMPH NODES: Bilateral cervical, supraclavicular, axillary lymph node examination was done.  Negative for any palpable adenopathy.  BREAST: Post lumpectomy changes in right breast. She does have induration around the scar upper inner quadrant of the breast due to mammosite. Left breast also has slight lumpiness in the upper outer quadrant. No nipple discharge in either breast.      Lab Results     No results found for this or any previous visit (from the past 240 hour(s)).     Imaging Results     No results found.     Brett Grijalva MD       "

## 2021-10-18 NOTE — LETTER
"    10/18/2021         RE: Eli Vivas  [address] [facility] [address]        Dear Colleague,    Thank you for referring your patient, Eli Vivas, to the [facility]. Please see a copy of my visit note below.    Oncology Rooming Note      10/18/21 10:41 AM       Eli Vivas is a 75 year old female who presents for:      Chief Complaint   Patient presents with     Oncology Clinic Visit     Breast Cancer     Breast Cancer         Initial Vitals: BP (!) 182/85 (BP Location: Left arm, Patient Position: Sitting, Cuff Size: Adult Regular)   Pulse 71   Temp 98.2  F (36.8  C) (Oral)   Resp 14   Ht 1.549 m (5' 1\")   Wt 100.9 kg (222 lb 6.4 oz)   SpO2 95%   BMI 42.02 kg/m   Estimated body mass index is 42.02 kg/m  as calculated from the following:    Height as of this encounter: 1.549 m (5' 1\").    Weight as of this encounter: 100.9 kg (222 lb 6.4 oz). Body surface area is 2.08 meters squared.      Mild Pain (2) Comment: Data Unavailable       No LMP recorded.      Allergies reviewed: Yes  Medications reviewed: Yes      Medications: Medication refills not needed today.  Pharmacy name entered into [facility]: [id number] [address] AT [facility] [address]    Accompanied by: alone    Clinical concerns:  no concerns                            [facility] cancer Care Progress Note  Patient: Eli Vivas   MRN:  [id number]   Date of Service : Oct 18, 2021        Reason for visit      1. Malignant neoplasm of upper-outer quadrant of right breast in female, estrogen receptor positive (H)        Assessment     1. A 75 year old   woman postmenopausal CA breast, right side, T1 N0 M0, stage I, ER/NE positive, HER-2/breana negative, status post lumpectomy, radiation  and 2 years of tamoxifen on Arimidex from December 2013 till 2016.  Her performance status is 1.  Her last mammogram in December was fine.  2.   Mild osteopenia on " her bone density.  3.   Normal endometrial thickening and no more dysfunctional uterine bleeding.  4.  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  5.  Long-term Prilosec use for gastroesophageal reflux disease.  6.  Fully Covid vaccinated.    Plan     1. Continue observation. Mammogram in December. Next bone density in 2022.  2. Follow-up with me in 12 months time.  3. High calcium diet and vit d supplement.   4. F/u with Dr. Kapoor  for other medical issues.    Clinical stage      Breast cancer of upper-outer quadrant of right female breast    Primary site: Breast (Right)    Staging method: AJCC 7th Edition    Pathologic free text: ER+/WV+/HER2 -ve    Pathologic: Stage IA (T1c, N0, cM0) - Signed by Brett Grijalva MD on 8/13/2014    Summary: Stage IA (T1c, N0, cM0)      History     Eli Vivas is a very pleasant 75 year old  female with a history of breast cancer diagnosed in October of 2011 located on the right side, measuring 1.3 cm in size, present on a mammogram, ER/WV positive, HER-2/breana negative, grade 1 for which she underwent radiation therapy and took tamoxifen until November of 2011.  Subsequent to that, after that we switched her to Arimidex. She continues to have some bone and joint side effects. She did try stopping Arimidex for a week but didn't really make much difference. In April 2014 she did have some spotting and then she had what she calls a menstrual period and then had it again in May 2014. She was evaluated by Dr. lKine from [facility] and she has been told she has thickened endometrium.  She has not had any problems since then. Finished 5 years of AI therapy in October 2016.    Comes in today for her annual appointment. No new issues. She is continuing yearly mammograms. Had bone density in September 2020 which showed slight improvement.  She did try getting off of Prilosec last year in 2018 but had significant rebound acidity that she went back on it.  She has some achy knees which  "prevent her from walking very much.    Past Medical History     Past Medical History:   Diagnosis Date     Breast cancer (H) 2011    ca     Chronic kidney disease      GERD (gastroesophageal reflux disease)      Hx of radiation therapy 2011     Hypertension      Hypothyroidism      Sleep apnea        Review of Systems   Constitutional  Constitutional (WDL): All constitutional elements are within defined limits  Neurosensory  Neurosensory (WDL): Exceptions to WDL  Ataxia: Asymptomatic, clinical or diagnostic observations only, intervention not indicated(Uses a cane; forgot it at home. )  Cardiovascular  Cardiovascular (WDL): Exceptions to WDL  Edema: Yes(Legs, ankles, feet. )  Pulmonary  Respiratory (WDL): Exceptions to WDL  Dyspnea: Shortness of breath with moderate exertion  Gastrointestinal  Gastrointestinal (WDL): Exceptions to WDL  Constipation: Occasional or intermittent symptoms, occasional use of stool softeners, laxatives, dietary modification, or enema  Esophagitis: Asymptomatic, clinical or diagnostic observations only, intervention not indicated(Acid refulx; on omeprazole. )  Dry Mouth: Symptomatic (e.g., dry or thick saliva) without significant dietary alteration, unstimulated saliva flow >0.2 ml/min  Genitourinary  Genitourinary (WDL): Exceptions to WDL(Some stress incontinence. )  Integumentary  Integumentary (WDL): All integumentary elements are within defined limits  Patient Coping  Patient Coping: Accepting  Accompanied by  Accompanied by: Alone    ECOG performance status and Distress Assessment      ECOG Performance:    ECOG Performance Status: 1    Distress Assessment  Distress Assessment Score: No distress:     Pain Status  Currently in Pain: No/denies        Vital Signs     BP (!) 169/72 (BP Location: Left arm, Patient Position: Sitting, Cuff Size: Adult Regular)   Pulse 71   Temp 98.2  F (36.8  C) (Oral)   Resp 14   Ht 1.549 m (5' 1\")   Wt 100.9 kg (222 lb 6.4 oz)   SpO2 95%   BMI 42.02 " kg/m       Physical Exam     GENERAL: No acute distress. Cooperative in conversation.   HEENT: Pupils are equal, round and reactive. Oral mucosa is clean and intact. No ulcerations or mucositis noted. No bleeding noted.  RESP:Chest symmetric lungs are clear bilaterally per auscultation. Regular respiratory rate. No wheezes or rhonchi.  CV: Normal S1 S2 Regular, rate and rhythm. No murmurs.  ABD: Nondistended, soft, nontender. Positive bowel sounds. No organomegaly.   EXTREMITIES: No lower extremity edema.   NEURO: Non- focal. Alert and oriented x3.  Cranial nerves appear intact.  PSYCH: Within normal limits. No depression or anxiety.  SKIN: Warm dry intact.    LYMPH NODES: Bilateral cervical, supraclavicular, axillary lymph node examination was done.  Negative for any palpable adenopathy.  BREAST: Post lumpectomy changes in right breast. She does have induration around the scar upper inner quadrant of the breast due to mammosite. Left breast also has slight lumpiness in the upper outer quadrant. No nipple discharge in either breast.      Lab Results     No results found for this or any previous visit (from the past 240 hour(s)).     Imaging Results     No results found.     Brett Grijalva MD           Again, thank you for allowing me to participate in the care of your patient.        Sincerely,        Brett Grijalva MD, MD

## 2021-11-10 ENCOUNTER — TELEPHONE (OUTPATIENT)
Dept: ONCOLOGY | Facility: HOSPITAL | Age: 76
End: 2021-11-10
Payer: COMMERCIAL

## 2021-11-10 NOTE — TELEPHONE ENCOUNTER
Patient calls in stating that she is trying to figure out if Dr Grijalva will be part of her insurance plan next year.  The only place that I know to direct her to is the billing office.  I have directed her over to their office as well as given her the phone number.  She was appreciative.    Shila Millard RN

## 2021-11-30 ENCOUNTER — LAB REQUISITION (OUTPATIENT)
Dept: LAB | Facility: CLINIC | Age: 76
End: 2021-11-30
Payer: COMMERCIAL

## 2021-11-30 DIAGNOSIS — E78.5 HYPERLIPIDEMIA, UNSPECIFIED: ICD-10-CM

## 2021-11-30 DIAGNOSIS — E03.9 HYPOTHYROIDISM, UNSPECIFIED: ICD-10-CM

## 2021-11-30 DIAGNOSIS — I10 ESSENTIAL (PRIMARY) HYPERTENSION: ICD-10-CM

## 2021-11-30 DIAGNOSIS — I25.10 ATHEROSCLEROTIC HEART DISEASE OF NATIVE CORONARY ARTERY WITHOUT ANGINA PECTORIS: ICD-10-CM

## 2021-11-30 LAB
ALBUMIN SERPL-MCNC: 3.9 G/DL (ref 3.5–5)
ALP SERPL-CCNC: 72 U/L (ref 45–120)
ALT SERPL W P-5'-P-CCNC: 16 U/L (ref 0–45)
ANION GAP SERPL CALCULATED.3IONS-SCNC: 11 MMOL/L (ref 5–18)
AST SERPL W P-5'-P-CCNC: 19 U/L (ref 0–40)
BILIRUB SERPL-MCNC: 0.6 MG/DL (ref 0–1)
BUN SERPL-MCNC: 11 MG/DL (ref 8–28)
CALCIUM SERPL-MCNC: 9.7 MG/DL (ref 8.5–10.5)
CHLORIDE BLD-SCNC: 109 MMOL/L (ref 98–107)
CHOLEST SERPL-MCNC: 182 MG/DL
CO2 SERPL-SCNC: 24 MMOL/L (ref 22–31)
CREAT SERPL-MCNC: 0.77 MG/DL (ref 0.6–1.1)
GFR SERPL CREATININE-BSD FRML MDRD: 75 ML/MIN/1.73M2
GLUCOSE BLD-MCNC: 101 MG/DL (ref 70–125)
HDLC SERPL-MCNC: 54 MG/DL
LDLC SERPL CALC-MCNC: 94 MG/DL
POTASSIUM BLD-SCNC: 4.1 MMOL/L (ref 3.5–5)
PROT SERPL-MCNC: 6.8 G/DL (ref 6–8)
SODIUM SERPL-SCNC: 144 MMOL/L (ref 136–145)
TRIGL SERPL-MCNC: 170 MG/DL
TSH SERPL DL<=0.005 MIU/L-ACNC: 0.81 UIU/ML (ref 0.3–5)

## 2021-11-30 PROCEDURE — 80053 COMPREHEN METABOLIC PANEL: CPT | Mod: ORL | Performed by: FAMILY MEDICINE

## 2021-11-30 PROCEDURE — 80061 LIPID PANEL: CPT | Mod: ORL | Performed by: FAMILY MEDICINE

## 2021-11-30 PROCEDURE — 84443 ASSAY THYROID STIM HORMONE: CPT | Mod: ORL | Performed by: FAMILY MEDICINE

## 2021-12-20 ENCOUNTER — ANCILLARY PROCEDURE (OUTPATIENT)
Dept: MAMMOGRAPHY | Facility: CLINIC | Age: 76
End: 2021-12-20
Attending: FAMILY MEDICINE
Payer: COMMERCIAL

## 2021-12-20 DIAGNOSIS — Z12.31 VISIT FOR SCREENING MAMMOGRAM: ICD-10-CM

## 2021-12-20 PROCEDURE — 77063 BREAST TOMOSYNTHESIS BI: CPT

## 2021-12-28 ENCOUNTER — TRANSCRIBE ORDERS (OUTPATIENT)
Dept: PHYSICAL MEDICINE AND REHAB | Facility: CLINIC | Age: 76
End: 2021-12-28
Payer: COMMERCIAL

## 2021-12-28 DIAGNOSIS — M54.16 LUMBAR BACK PAIN WITH RADICULOPATHY AFFECTING LEFT LOWER EXTREMITY: Primary | ICD-10-CM

## 2022-01-03 ENCOUNTER — OFFICE VISIT (OUTPATIENT)
Dept: PHYSICAL MEDICINE AND REHAB | Facility: CLINIC | Age: 77
End: 2022-01-03
Attending: FAMILY MEDICINE
Payer: COMMERCIAL

## 2022-01-03 VITALS — OXYGEN SATURATION: 96 % | DIASTOLIC BLOOD PRESSURE: 70 MMHG | HEART RATE: 83 BPM | SYSTOLIC BLOOD PRESSURE: 158 MMHG

## 2022-01-03 DIAGNOSIS — M48.061 LUMBAR FORAMINAL STENOSIS: ICD-10-CM

## 2022-01-03 DIAGNOSIS — M51.369 DDD (DEGENERATIVE DISC DISEASE), LUMBAR: ICD-10-CM

## 2022-01-03 DIAGNOSIS — M41.9 SCOLIOSIS OF LUMBAR SPINE, UNSPECIFIED SCOLIOSIS TYPE: ICD-10-CM

## 2022-01-03 DIAGNOSIS — M54.16 LUMBAR BACK PAIN WITH RADICULOPATHY AFFECTING LEFT LOWER EXTREMITY: ICD-10-CM

## 2022-01-03 DIAGNOSIS — M54.16 LEFT LUMBAR RADICULOPATHY: Primary | ICD-10-CM

## 2022-01-03 PROCEDURE — 99204 OFFICE O/P NEW MOD 45 MIN: CPT | Performed by: NURSE PRACTITIONER

## 2022-01-03 RX ORDER — GABAPENTIN 100 MG/1
100 CAPSULE ORAL 3 TIMES DAILY
Qty: 90 CAPSULE | Refills: 3 | Status: SHIPPED | OUTPATIENT
Start: 2022-01-03

## 2022-01-03 ASSESSMENT — PAIN SCALES - GENERAL: PAINLEVEL: MODERATE PAIN (5)

## 2022-01-03 NOTE — PROGRESS NOTES
ASSESSMENT: Eli Vivas is a 76 year old female who presents for consultation at the request of PCP Vinny Kapoor, with a past medical history significant for hyperlipidemia, hypertension, hypothyroidism, GERD, osteoporosis, asthma, history of left knee surgery, breast cancer-estrogen receptor positive-diagnosed 2011, who presents today for new patient evaluation of:    -Bilateral low back pain with progressive left lumbar radiculopathy L5 dermatomal pattern.  MRI with L5-S1 degenerative changes L5-S1 with left spondylosis and significant left foraminal stenosis.    Patient is neurologically intact on exam. No myelopathic or red flag symptoms.     OSWESTRY DISABILITY INDEX 1/3/2022   Count 10   Sum 26   Oswestry Score (%) 52       Diagnoses and all orders for this visit:  Left lumbar radiculopathy  -     Physical Therapy Referral; Future  -     gabapentin (NEURONTIN) 100 MG capsule; Take 1 capsule (100 mg) by mouth 3 times daily  Lumbar back pain with radiculopathy affecting left lower extremity  -     Spine Referral  -     Physical Therapy Referral; Future  DDD (degenerative disc disease), lumbar  -     Physical Therapy Referral; Future  Lumbar foraminal stenosis  -     Physical Therapy Referral; Future  Scoliosis of lumbar spine, unspecified scoliosis type  -     Physical Therapy Referral; Future    PLAN:  Reviewed spine anatomy and disease process. Discussed diagnosis and treatment options with the patient today. A shared decision making model was used.  The patient's values and choices were respected. The following represents what was discussed and decided upon by the provider and the patient.      -DIAGNOSTIC TESTS:  Images were personally reviewed and interpreted and explained to patient today using spine model.   --Consider left lower extremity EMG if symptoms or not improving down the road.  --Lumbar spine MRI CDI 12/15/2021 with 27 degree left rotoscoliosis centered at L3.  56 degree exaggerated  lumbar lordosis.  Multilevel spondylosis that is severe on the left at L5-S1 with marked left facet arthropathy with severe left foraminal stenosis.  No S1 involvement.  L4-5 Marked arthrosis, 3 mm spondylolisthesis with moderate to marked left foraminal stenosis.  L3-4 moderate to marked left foraminal stenosis.    -PHYSICAL THERAPY: [facility] [place] to establish home exercises for core strengthening and nerve glides.  Discussed the importance of core strengthening, ROM, stretching exercises with the patient and how each of these entities is important in decreasing pain.  Explained to the patient that the purpose of physical therapy is to teach the patient a home exercise program.  These exercises need to be performed every day in order to decrease pain and prevent future occurrences of pain.        -MEDICATIONS: Prescribed gabapentin low-dose 100 mg to titrate up to 1 tablet 3 times daily as tolerated.  Discussed multiple medication options today with patient. Discussed risks, side effects, and proper use of medications. Patient verbalized understanding.    -INTERVENTIONS: Did discuss considering left L5-S1 transforaminal epidural steroid injection at any point if symptoms are not improving or worsening.  Patient would like to trial conservative treatments first.  Discussed risks and benefits of injections with patient today.    -PATIENT EDUCATION:  Total time of 46 minutes, on the day of service, spent with the patient, reviewing the chart, placing orders, and documenting.   -Today we also discussed the issues related to the current COVID-19 pandemic, the pros and cons of the current treatment plan, the CDC guidelines such as social distancing, washing hands, masking, and covering the cough.    -FOLLOW-UP:   Follow-up in 4 weeks if symptoms or not improving, sooner if pain is worsening or new symptoms arise.    Advised patient to call the Spine  Center if symptoms worsen or you have problems controlling bladder and bowel function.   ______________________________________________________________________    SUBJECTIVE:  HPI:  Eli Vivas  Is a 76 year old female who presents today for new patient evaluation of low back pain bilateral low back that is worse on the left that radiates to the left posterior lateral thigh and lateral shin with associated numbness and tingling that has been progressive in the last 6 months.  Patient denies lower extremity weakness.  Denies recent trips or falls or balance changes.  Denies current right leg symptoms.  Does report that her pain today is a 5/10 up to 10 at its worst, 1 at its best.  She is working on some exercises given to her by her PCP and tolerating these well.    -Treatment to Date: No prior spinal surgery or spinal injections.  No prior formalized physical therapy for back pain but she does a few home exercises given to her by her PCP.    History of right knee steroid injections with benefit.    -Medications:  Intermittent Tylenol and ibuprofen with some relief.    Current Outpatient Medications   Medication     acetaminophen (TYLENOL) 500 MG tablet     gabapentin (NEURONTIN) 100 MG capsule     IBUPROFEN (ADVIL ORAL)     ascorbic acid (ASCORBIC ACID WITH MEHDI HIPS) 500 MG tablet     aspirin 81 MG tablet     atorvastatin (LIPITOR) 10 MG tablet     CALCIUM CARBONATE-VITAMIN D3 ORAL     cholecalciferol, vitamin D3, (VITAMIN D3) 2,000 unit cap     fluocinonide (LIDEX) 0.05 % cream     GLUCOSAMINE SULFATE ORAL     levothyroxine 88 mcg cap     MEDICATION CANNOT BE REORDERED - PLEASE MANUALLY REORDER AND DISCONTINUE THE OLD ORDER     multivitamin (MULTIVITAMIN) per tablet     omeprazole (PRILOSEC) 20 MG capsule     pseudoephedrine HCl (SUDAFED 24 HR) 240 mg Tb24 tablet     PSYLLIUM SEED, WITH SUGAR, (METAMUCIL ORAL)     verapamil 200 mg CPCT     No current facility-administered medications for this visit.        Allergies   Allergen Reactions     Bextra [Valdecoxib] Other (See Comments)     Leg Pain     Simvastatin Other (See Comments)     Muscleaches     Vioxx [Rofecoxib] Other (See Comments)     Leg Pain     Adhesive [Mecrylate] Rash     Septra [Sulfamethoxazole W/Trimethoprim] Rash       Past Medical History:   Diagnosis Date     Breast cancer (H) 2011    [place]     Chronic kidney disease      GERD (gastroesophageal reflux disease)      Hx of radiation therapy 2011     Hypertension      Hypothyroidism      Sleep apnea         Patient Active Problem List   Diagnosis     Hypothyroidism     Hyperlipidemia     Obesity     Hypertension     Asthma     Esophageal Reflux     Arthritis     Malignant neoplasm of upper-outer quadrant of right breast in female, estrogen receptor positive (H)     Osteoporosis       Past Surgical History:   Procedure Laterality Date     BIOPSY BREAST Right 2011    ca     CATARACT EXTRACTION Bilateral      LUMPECTOMY BREAST Right 2011       Family History   Problem Relation Age of Onset     Emphysema Mother      Heart Disease Maternal Grandmother      Diabetes Paternal Grandmother      Breast Cancer Maternal Aunt      Breast Cancer Paternal Aunt      Hereditary Breast and Ovarian Cancer Syndrome No family hx of      Cancer No family hx of      Colon Cancer No family hx of      Endometrial Cancer No family hx of      Ovarian Cancer No family hx of        Reviewed past medical, surgical, and family history with patient found on new patient intake packet located in EMR Media tab.     SOCIAL HX: Patient is  and retired.  Patient denies smoking/tobacco use.  Denies alcohol use, denies history being heavy drinker, denies recreational drug use.    ROS: Positive for joint pain, sciatica, itching, easy bruising, constipation, reflux, shortness of breath, cough, leg swelling, headache.  Specifically negative for bowel/bladder dysfunction, balance changes, dizziness, foot drop, fevers, chills, appetite  changes, nausea/vomiting, unexplained weight loss. Otherwise 13 systems reviewed are negative. Please see the patient's intake questionnaire from today for details.    OBJECTIVE:  BP (!) 158/70 (BP Location: Right arm, Patient Position: Sitting)   Pulse 83   SpO2 96%     PHYSICAL EXAMINATION:    --CONSTITUTIONAL:  Vital signs as above.  No acute distress.  The patient is well nourished and well groomed.  --PSYCHIATRIC:  Appropriate mood and affect. The patient is awake, alert, oriented to person, place, time and answering questions appropriately with clear speech.    --SKIN:  Skin over the face, bilateral lower extremities, and posterior torso is clean, dry, intact without rashes.    --RESPIRATORY: Normal rhythm and effort. No abnormal accessory muscle breathing patterns noted.   --ABDOMINAL:  Non-distended.  --STANDING EXAMINATION:  Normal lumbar lordosis noted, no lateral shift.  --MUSCULOSKELETAL: Lumbar spine inspection reveals no evidence of deformity. Range of motion is not limited in lumbar flexion, extension, lateral rotation. No tenderness to palpation lumbar spine. Straight leg raising in the seated position is negative to radicular pain on the left, positive on the right. Sciatic notch non-tender.  --SACROILIAC JOINT: One Finger point test negative.  --GROSS MOTOR: Gait is non-antalgic. Easily arises from a seated position.   --LOWER EXTREMITY MOTOR TESTING:  Plantar flexion left 5/5, right 5/5   Dorsiflexion left 5/5, right 5/5   Great toe MTP extension left 5/5, right 5/5  Knee flexion left 5/5, right 5/5  Knee extension left 5/5, right 5/5   Hip flexion left 5/5, right 5/5  Hip abduction left 5/5, right 5/5  Hip adduction left 5/5, right 5/5   --HIPS: Full range of motion bilaterally.   --NEUROLOGICAL:  2/4 left and 0/4 right patellar reflexes, 2/4 bilateral achilles reflexes.  Sensation to light touch is intact in the bilateral L4, L5, and S1 dermatomes. Babinski is negative. No clonus.  Negative  Alfreda reflex bilaterally.  --VASCULAR:  2/4 dorsalis pedis and posterior tibialsi pulses bilaterally.  Bilateral lower extremities are warm.  There is no pitting edema of the bilateral lower extremities.    RESULTS: Prior medical records from [facility]where were reviewed today.    Imaging: Lumbar spine Imaging was personally reviewed and interpreted today. The images were shown to the patient and the findings were explained using a spine model.      MA Screen Bilateral w/Gurpreet    Result Date: 12/20/2021  BILATERAL FULL FIELD DIGITAL SCREENING MAMMOGRAM WITH TOMOSYNTHESIS Performed on: 12/20/21 Compared to: 12/17/2020, 11/26/2019, 11/02/2018, and 10/27/2017 Technique:  This study was evaluated with the assistance of Computer-Aided Detection.  Breast Tomosynthesis was used in interpretation. Findings: The breasts have scattered areas of fibroglandular density.  There are breast conservation changes in the right breast. There is no radiographic evidence of malignancy. IMPRESSION: ACR BI-RADS Category 2: Benign RECOMMENDED FOLLOW-UP: Annual routine screening mammogram The results and recommendations of this examination will be communicated to the patient.

## 2022-01-03 NOTE — LETTER
1/3/2022         RE: Eli Vivas  [address]  [place] [address]        Dear Colleague,    Thank you for referring your patient, Eli Vivas, to the [facility] [place]. Please see a copy of my visit note below.    ASSESSMENT: Eli Vivas is a 76 year old female who presents for consultation at the request of PCP Vinny Kapoor, with a past medical history significant for hyperlipidemia, hypertension, hypothyroidism, GERD, osteoporosis, asthma, history of left knee surgery, breast cancer-estrogen receptor positive-diagnosed 2011, who presents today for new patient evaluation of:    -Bilateral low back pain with progressive left lumbar radiculopathy L5 dermatomal pattern.  MRI with L5-S1 degenerative changes L5-S1 with left spondylosis and significant left foraminal stenosis.    Patient is neurologically intact on exam. No myelopathic or red flag symptoms.     OSWESTRY DISABILITY INDEX 1/3/2022   Count 10   Sum 26   Oswestry Score (%) 52       Diagnoses and all orders for this visit:  Left lumbar radiculopathy  -     Physical Therapy Referral; Future  -     gabapentin (NEURONTIN) 100 MG capsule; Take 1 capsule (100 mg) by mouth 3 times daily  Lumbar back pain with radiculopathy affecting left lower extremity  -     Spine Referral  -     Physical Therapy Referral; Future  DDD (degenerative disc disease), lumbar  -     Physical Therapy Referral; Future  Lumbar foraminal stenosis  -     Physical Therapy Referral; Future  Scoliosis of lumbar spine, unspecified scoliosis type  -     Physical Therapy Referral; Future    PLAN:  Reviewed spine anatomy and disease process. Discussed diagnosis and treatment options with the patient today. A shared decision making model was used.  The patient's values and choices were respected. The following represents what was discussed and decided upon by the provider and the patient.      -DIAGNOSTIC TESTS:  Images were personally reviewed and  interpreted and explained to patient today using spine model.   --Consider left lower extremity EMG if symptoms or not improving down the road.  --Lumbar spine MRI CDI 12/15/2021 with 27 degree left rotoscoliosis centered at L3.  56 degree exaggerated lumbar lordosis.  Multilevel spondylosis that is severe on the left at L5-S1 with marked left facet arthropathy with severe left foraminal stenosis.  No S1 involvement.  L4-5 Marked arthrosis, 3 mm spondylolisthesis with moderate to marked left foraminal stenosis.  L3-4 moderate to marked left foraminal stenosis.    -PHYSICAL THERAPY: Referral to physical therapy placed to [facility] [place] to establish home exercises for core strengthening and nerve glides.  Discussed the importance of core strengthening, ROM, stretching exercises with the patient and how each of these entities is important in decreasing pain.  Explained to the patient that the purpose of physical therapy is to teach the patient a home exercise program.  These exercises need to be performed every day in order to decrease pain and prevent future occurrences of pain.        -MEDICATIONS: Prescribed gabapentin low-dose 100 mg to titrate up to 1 tablet 3 times daily as tolerated.  Discussed multiple medication options today with patient. Discussed risks, side effects, and proper use of medications. Patient verbalized understanding.    -INTERVENTIONS: Did discuss considering left L5-S1 transforaminal epidural steroid injection at any point if symptoms are not improving or worsening.  Patient would like to trial conservative treatments first.  Discussed risks and benefits of injections with patient today.    -PATIENT EDUCATION:  Total time of 46 minutes, on the day of service, spent with the patient, reviewing the chart, placing orders, and documenting.   -Today we also discussed the issues related to the current COVID-19 pandemic, the pros and cons of the current treatment plan, the  CDC guidelines such as social distancing, washing hands, masking, and covering the cough.    -FOLLOW-UP:   Follow-up in 4 weeks if symptoms or not improving, sooner if pain is worsening or new symptoms arise.    Advised patient to call the Spine Center if symptoms worsen or you have problems controlling bladder and bowel function.   ______________________________________________________________________    SUBJECTIVE:  HPI:  Eli Vivas  Is a 76 year old female who presents today for new patient evaluation of low back pain bilateral low back that is worse on the left that radiates to the left posterior lateral thigh and lateral shin with associated numbness and tingling that has been progressive in the last 6 months.  Patient denies lower extremity weakness.  Denies recent trips or falls or balance changes.  Denies current right leg symptoms.  Does report that her pain today is a 5/10 up to 10 at its worst, 1 at its best.  She is working on some exercises given to her by her PCP and tolerating these well.    -Treatment to Date: No prior spinal surgery or spinal injections.  No prior formalized physical therapy for back pain but she does a few home exercises given to her by her PCP.    History of right knee steroid injections with benefit.    -Medications:  Intermittent Tylenol and ibuprofen with some relief.    Current Outpatient Medications   Medication     acetaminophen (TYLENOL) 500 MG tablet     gabapentin (NEURONTIN) 100 MG capsule     IBUPROFEN (ADVIL ORAL)     ascorbic acid (ASCORBIC ACID WITH MEHDI HIPS) 500 MG tablet     aspirin 81 MG tablet     atorvastatin (LIPITOR) 10 MG tablet     CALCIUM CARBONATE-VITAMIN D3 ORAL     cholecalciferol, vitamin D3, (VITAMIN D3) 2,000 unit cap     fluocinonide (LIDEX) 0.05 % cream     GLUCOSAMINE SULFATE ORAL     levothyroxine 88 mcg cap     MEDICATION CANNOT BE REORDERED - PLEASE MANUALLY REORDER AND DISCONTINUE THE OLD ORDER     multivitamin (MULTIVITAMIN) per tablet      omeprazole (PRILOSEC) 20 MG capsule     pseudoephedrine HCl (SUDAFED 24 HR) 240 mg Tb24 tablet     PSYLLIUM SEED, WITH SUGAR, (METAMUCIL ORAL)     verapamil 200 mg CPCT     No current facility-administered medications for this visit.       Allergies   Allergen Reactions     Bextra [Valdecoxib] Other (See Comments)     Leg Pain     Simvastatin Other (See Comments)     Muscleaches     Vioxx [Rofecoxib] Other (See Comments)     Leg Pain     Adhesive [Mecrylate] Rash     Septra [Sulfamethoxazole W/Trimethoprim] Rash       Past Medical History:   Diagnosis Date     Breast cancer (H) 2011    ca     Chronic kidney disease      GERD (gastroesophageal reflux disease)      Hx of radiation therapy 2011     Hypertension      Hypothyroidism      Sleep apnea         Patient Active Problem List   Diagnosis     Hypothyroidism     Hyperlipidemia     Obesity     Hypertension     Asthma     Esophageal Reflux     Arthritis     Malignant neoplasm of upper-outer quadrant of right breast in female, estrogen receptor positive (H)     Osteoporosis       Past Surgical History:   Procedure Laterality Date     BIOPSY BREAST Right 2011    ca     CATARACT EXTRACTION Bilateral      LUMPECTOMY BREAST Right 2011       Family History   Problem Relation Age of Onset     Emphysema Mother      Heart Disease Maternal Grandmother      Diabetes Paternal Grandmother      Breast Cancer Maternal Aunt      Breast Cancer Paternal Aunt      Hereditary Breast and Ovarian Cancer Syndrome No family hx of      Cancer No family hx of      Colon Cancer No family hx of      Endometrial Cancer No family hx of      Ovarian Cancer No family hx of        Reviewed past medical, surgical, and family history with patient found on new patient intake packet located in EMR Media tab.     SOCIAL HX: Patient is  and retired.  Patient denies smoking/tobacco use.  Denies alcohol use, denies history being heavy drinker, denies recreational drug use.    ROS: Positive for  joint pain, sciatica, itching, easy bruising, constipation, reflux, shortness of breath, cough, leg swelling, headache.  Specifically negative for bowel/bladder dysfunction, balance changes, dizziness, foot drop, fevers, chills, appetite changes, nausea/vomiting, unexplained weight loss. Otherwise 13 systems reviewed are negative. Please see the patient's intake questionnaire from today for details.    OBJECTIVE:  BP (!) 158/70 (BP Location: Right arm, Patient Position: Sitting)   Pulse 83   SpO2 96%     PHYSICAL EXAMINATION:    --CONSTITUTIONAL:  Vital signs as above.  No acute distress.  The patient is well nourished and well groomed.  --PSYCHIATRIC:  Appropriate mood and affect. The patient is awake, alert, oriented to person, place, time and answering questions appropriately with clear speech.    --SKIN:  Skin over the face, bilateral lower extremities, and posterior torso is clean, dry, intact without rashes.    --RESPIRATORY: Normal rhythm and effort. No abnormal accessory muscle breathing patterns noted.   --ABDOMINAL:  Non-distended.  --STANDING EXAMINATION:  Normal lumbar lordosis noted, no lateral shift.  --MUSCULOSKELETAL: Lumbar spine inspection reveals no evidence of deformity. Range of motion is not limited in lumbar flexion, extension, lateral rotation. No tenderness to palpation lumbar spine. Straight leg raising in the seated position is negative to radicular pain on the left, positive on the right. Sciatic notch non-tender.  --SACROILIAC JOINT: One Finger point test negative.  --GROSS MOTOR: Gait is non-antalgic. Easily arises from a seated position.   --LOWER EXTREMITY MOTOR TESTING:  Plantar flexion left 5/5, right 5/5   Dorsiflexion left 5/5, right 5/5   Great toe MTP extension left 5/5, right 5/5  Knee flexion left 5/5, right 5/5  Knee extension left 5/5, right 5/5   Hip flexion left 5/5, right 5/5  Hip abduction left 5/5, right 5/5  Hip adduction left 5/5, right 5/5   --HIPS: Full range of  motion bilaterally.   --NEUROLOGICAL:  2/4 left and 0/4 right patellar reflexes, 2/4 bilateral achilles reflexes.  Sensation to light touch is intact in the bilateral L4, L5, and S1 dermatomes. Babinski is negative. No clonus.  Negative Alfreda reflex bilaterally.  --VASCULAR:  2/4 dorsalis pedis and posterior tibialsi pulses bilaterally.  Bilateral lower extremities are warm.  There is no pitting edema of the bilateral lower extremities.    RESULTS: Prior medical records from [facility] were reviewed today.    Imaging: Lumbar spine Imaging was personally reviewed and interpreted today. The images were shown to the patient and the findings were explained using a spine model.      MA Screen Bilateral w/Gurpreet    Result Date: 12/20/2021  BILATERAL FULL FIELD DIGITAL SCREENING MAMMOGRAM WITH TOMOSYNTHESIS Performed on: 12/20/21 Compared to: 12/17/2020, 11/26/2019, 11/02/2018, and 10/27/2017 Technique:  This study was evaluated with the assistance of Computer-Aided Detection.  Breast Tomosynthesis was used in interpretation. Findings: The breasts have scattered areas of fibroglandular density.  There are breast conservation changes in the right breast. There is no radiographic evidence of malignancy. IMPRESSION: ACR BI-RADS Category 2: Benign RECOMMENDED FOLLOW-UP: Annual routine screening mammogram The results and recommendations of this examination will be communicated to the patient.                Again, thank you for allowing me to participate in the care of your patient.        Sincerely,        Devorah Vela, CNP

## 2022-01-03 NOTE — PATIENT INSTRUCTIONS
~[facility] scheduling #[phone].  ~Please call our [facility] Nurse Navigation line [phone] with any questions or concerns about your treatment plan, if symptoms worsen and you would like to be seen urgently, or if you have problems controlling bladder and bowel function.      ~You have been referred for Physical Therapy to [facility]. They will call you to schedule an appointment.      Scheduling phone number is [phone] for [facility], or [place] location.  If you have not heard from the scheduling office within 2 business days, please call [phone] for ALL other locations.    Discussed the importance of core strengthening, ROM, stretching exercises and how each of these entities is important in decreasing pain and improving long term spine health.  The purpose of physical therapy is to teach you an individualized home exercise program.  These exercises need to be performed every day in order to decrease pain and prevent future occurrences of pain.          Prescribed Gabapentin today, 100 mg tablets, to be titrated up to 1 tablets 3 times a day as tolerated for your nerve pain. Please follow Gabapentin dosing chart below.    Gabapentin 100mg Dosing Chart    DATE  MORNING AFTERNOON BEDTIME    Day 1 0 0 1    Day 2 0 0 1    Day 3 0 0 1    Day 4 1 0 1    Day 5 1 0 1    Day 6 1 0 1    Day 7 1 1 1     Continue medication, taking 1 capsules three times daily  Please call if you have any questions regarding how to take your medication

## 2022-01-04 ENCOUNTER — HOSPITAL ENCOUNTER (OUTPATIENT)
Dept: PHYSICAL THERAPY | Facility: REHABILITATION | Age: 77
End: 2022-01-04
Attending: NURSE PRACTITIONER
Payer: COMMERCIAL

## 2022-01-04 DIAGNOSIS — M41.9 SCOLIOSIS OF LUMBAR SPINE, UNSPECIFIED SCOLIOSIS TYPE: ICD-10-CM

## 2022-01-04 DIAGNOSIS — M51.369 DDD (DEGENERATIVE DISC DISEASE), LUMBAR: ICD-10-CM

## 2022-01-04 DIAGNOSIS — M54.16 LUMBAR BACK PAIN WITH RADICULOPATHY AFFECTING LEFT LOWER EXTREMITY: ICD-10-CM

## 2022-01-04 DIAGNOSIS — M62.81 MUSCLE WEAKNESS (GENERALIZED): Primary | ICD-10-CM

## 2022-01-04 DIAGNOSIS — M48.061 LUMBAR FORAMINAL STENOSIS: ICD-10-CM

## 2022-01-04 DIAGNOSIS — M54.16 LEFT LUMBAR RADICULOPATHY: ICD-10-CM

## 2022-01-04 PROCEDURE — 97110 THERAPEUTIC EXERCISES: CPT | Mod: GP | Performed by: PHYSICAL THERAPIST

## 2022-01-04 PROCEDURE — 97161 PT EVAL LOW COMPLEX 20 MIN: CPT | Mod: GP | Performed by: PHYSICAL THERAPIST

## 2022-01-05 NOTE — PROGRESS NOTES
[facility]    OUTPATIENT PHYSICAL THERAPY ORTHOPEDIC EVALUATION  PLAN OF TREATMENT FOR OUTPATIENT REHABILITATION  (COMPLETE FOR INITIAL CLAIMS ONLY)  Patient's Last Name, First Name, M.I.  YOB: 1945  Eli Vivas    Provider s Name:  [facility]   Medical Record No.  [id number]   Start of Care Date:  01/04/22   Onset Date:  01/03/22   Type:     _X__PT   ___OT   ___SLP Medical Diagnosis:  (P) Lumbar radiculopathy     PT Diagnosis:  (P) Lumbar radiculopathy    Visits from SOC:  1      _________________________________________________________________________________  Plan of Treatment/Functional Goals:  (P) joint mobilization,manual therapy,neuromuscular re-education,ROM,strengthening,stretching     (P) Electrical stimulation,TENS     Goals  Goal Identifier: (P) HEP   Goal Description: (P) Patient will be independent in a HEP in 12 weeks  Target Date: (P) 03/29/22    Goal Identifier: (P) Sitting  Goal Description: (P) Patient will be able to sit > 30 min without increase in pain in back and leg in 12 weeks  Target Date: (P) 03/29/22    Therapy Frequency:  (P) 1 time/week  Predicted Duration of Therapy Intervention:  (P) to every other week x 12 weeks for 4-6 sessions lópez Vasquez PT                 I CERTIFY THE NEED FOR THESE SERVICES FURNISHED UNDER        THIS PLAN OF TREATMENT AND WHILE UNDER MY CARE     (Physician co-signature of this document indicates review and certification of the therapy plan).                       Certification Date From:  (P) 01/04/22   Certification Date To:  (P) 03/29/22    Referring Provider:  Devorah Vela CNP     Initial Assessment        See Epic Evaluation Start of Care Date: 01/04/22 01/04/22 1400   General Information   Type of Visit Initial OP Ortho PT Evaluation   Start of Care Date 01/04/22    Referring Physician Devorah Vela CNP    Patient/Family Goals Statement Getting better   Orders Evaluate and Treat   Date of Order 01/03/22   Certification Required? Yes   Medical Diagnosis Lumbar DDD, Lumbar radiculopathy, scoiliosis    Surgical/Medical history reviewed Yes   Precautions/Limitations no known precautions/limitations   Weight-Bearing Status - LUE full weight-bearing   Weight-Bearing Status - RUE full weight-bearing   Weight-Bearing Status - LLE full weight-bearing   Weight-Bearing Status - RLE full weight-bearing       Present No   Body Part(s)   Body Part(s) Lumbar Spine/SI   Presentation and Etiology   Pertinent history of current problem (include personal factors and/or comorbidities that impact the POC) Patient reports the pain started many years ago, the pain has been on and off since she was 15 years old. no specific injury. She has arthritis and ahs since she was a teenager. pain increased recently and had been progressively getting worse. she has DDD with some narrowing and pinching at L5-S1 with pain radiating down the left leg. sitting too long or any activity for too long will increase the pain. she will get up and move around and do some exercises and take a deep breaths to help decrease the pain. Ankle and knee surgery in the past, hx of ankle fracture more than 50 years ago and then had surgery to repain in 2014 to take a bone out of knee and use it to repair the ankle. hx of right breast cancer with lumpectomy with radiation.    Impairments D. Decreased ROM;A. Pain;F. Decreased strength and endurance;J. Burning;K. Numbness;L. Tingling;E. Decreased flexibility   Functional Limitations perform activities of daily living;perform desired leisure / sports activities   Symptom Location left low back and down to the left leg   How/Where did it occur From Degenerative Joint Disease   Onset date of current episode/exacerbation 01/03/22   Chronicity Chronic   Pain  rating (0-10 point scale) Best (/10);Worst (/10)   Best (/10) 1-2   Worst (/10) 9   Pain quality C. Aching;D. Burning   Frequency of pain/symptoms A. Constant   Pain/symptoms are: The same all the time   Pain/symptoms exacerbated by D. Carrying;C. Lifting;B. Walking;A. Sitting;G. Certain positions;I. Bending   Pain/symptoms eased by C. Rest;E. Changing positions;I. OTC medication(s);H. Cold;G. Heat   Progression of symptoms since onset: Worsened   Current / Previous Interventions   Diagnostic Tests: MRI;X-ray   MRI Results Results   MRI results see imaging results    Current Level of Function   Current Community Support Family/friend caregiver   Patient role/employment history F. Retired   Living environment House/townhome   Home/community accessibility no issues   Current equipment-Gait/Locomotion None   Current equipment-ADL None   Fall Risk Screen   Fall screen completed by PT   Have you fallen 2 or more times in the past year? No   Have you fallen and had an injury in the past year? No   Is patient a fall risk? No   Abuse Screen (yes response referral indicated)   Feels Unsafe at Home or Work/School no   Feels Threatened by Someone no   Does Anyone Try to Keep You From Having Contact with Others or Doing Things Outside Your Home? no   Physical Signs of Abuse Present no   System Outcome Measures   Outcome Measures Low Back Pain (see Oswestry and Raman)  (ADRIEL 30 )   Lumbar Spine/SI Objective Findings   Gait/Locomotion WNL    Balance/Proprioception (Single Leg Stance) instability - due to hx of knee/ankle surgery and OA of bilateral knees and feet, weakness    Flexion ROM to floor, no pain   Extension ROM min dec, mild pain at end range    Right Side Bending ROM min dec pain on L   Left Side Bending ROM min dec tight on L   Repeated Extension-Standing ROM WNL    Repeated Flexion-Standing ROM WNL   Lumbar ROM Comment rotation min dec, no pain each way    Lumbar/Hip/Knee/Foot Strength Comments able to heel and toe  walk, weakness in hip noted with SL stance;    Hamstring Flexibility min tightness bilaterally    Piriformis Flexibility min tightness bilaterally    SLR negative   Crossover SLR neg   Slump Test negative    Sensation Testing WNL to light touch in B LEs   Planned Therapy Interventions   Planned Therapy Interventions joint mobilization;manual therapy;neuromuscular re-education;ROM;strengthening;stretching   Planned Modality Interventions   Planned Modality Interventions Electrical stimulation;TENS   Clinical Impression   Criteria for Skilled Therapeutic Interventions Met yes, treatment indicated   PT Diagnosis Lumbar radiculopathy    Influenced by the following impairments pain, weakeness, decreased joint mobility    Functional limitations due to impairments decrease sitting, standing, lifting    Clinical Presentation Stable/Uncomplicated   Clinical Decision Making (Complexity) Low complexity   Therapy Frequency 1 time/week   Predicted Duration of Therapy Intervention (days/wks) to every other week x 12 weeks for 4-6 sessions total    Risk & Benefits of therapy have been explained Yes   Patient, Family & other staff in agreement with plan of care Yes   Clinical Impression Comments Pateint presents to therapy with chronic low back pain with new onset of left leg pain. Patient has hx of low back pain with arthritis since she was 15 years old. Patient had negative neural tension today but had hx of leg pain down to the foot. Pateint will be benefit from skilled therapy to increase strength and mobility and decrease pain for increased in functional activity tolerance   Education Assessment   Preferred Learning Style Listening;Reading;Demonstration;Pictures/video   Barriers to Learning No barriers   ORTHO GOALS   PT Ortho Eval Goals 1;2   Ortho Goal 1   Goal Identifier HEP    Goal Description Patient will be independent in a HEP in 12 weeks   Target Date 03/29/22   Ortho Goal 2   Goal Identifier Sitting   Goal Description  Patient will be able to sit > 30 min without increase in pain in back and leg in 12 weeks   Target Date 03/29/22   Total Evaluation Time   PT Eval, Low Complexity Minutes (11059) 25   Therapy Certification   Certification date from 01/04/22   Certification date to 03/29/22   Medical Diagnosis Lumbar radiculopathy     Lorena Vasquez, PT, DPT, CLT-ZAK

## 2022-01-11 ENCOUNTER — HOSPITAL ENCOUNTER (OUTPATIENT)
Dept: PHYSICAL THERAPY | Facility: REHABILITATION | Age: 77
End: 2022-01-11
Payer: COMMERCIAL

## 2022-01-11 DIAGNOSIS — M48.061 LUMBAR FORAMINAL STENOSIS: ICD-10-CM

## 2022-01-11 DIAGNOSIS — M41.9 SCOLIOSIS OF LUMBAR SPINE, UNSPECIFIED SCOLIOSIS TYPE: ICD-10-CM

## 2022-01-11 DIAGNOSIS — M51.369 DDD (DEGENERATIVE DISC DISEASE), LUMBAR: ICD-10-CM

## 2022-01-11 DIAGNOSIS — M62.81 MUSCLE WEAKNESS (GENERALIZED): ICD-10-CM

## 2022-01-11 DIAGNOSIS — M54.16 LUMBAR BACK PAIN WITH RADICULOPATHY AFFECTING LEFT LOWER EXTREMITY: Primary | ICD-10-CM

## 2022-01-11 DIAGNOSIS — M54.16 LEFT LUMBAR RADICULOPATHY: ICD-10-CM

## 2022-01-11 PROCEDURE — 97110 THERAPEUTIC EXERCISES: CPT | Mod: GP | Performed by: PHYSICAL THERAPIST

## 2022-02-21 ENCOUNTER — TELEPHONE (OUTPATIENT)
Dept: PHYSICAL MEDICINE AND REHAB | Facility: CLINIC | Age: 77
End: 2022-02-21
Payer: COMMERCIAL

## 2022-02-21 DIAGNOSIS — M48.061 LUMBAR FORAMINAL STENOSIS: ICD-10-CM

## 2022-02-21 DIAGNOSIS — M54.16 LEFT LUMBAR RADICULOPATHY: Primary | ICD-10-CM

## 2022-02-21 NOTE — TELEPHONE ENCOUNTER
PSP:  Devorah Vela CNP  Last clinic visit:  1/3/2022  Reason for call: Update  Clinical information:  Call received from pt. Pt inquiring about the next steps in her treatment plan. Pt reports she went to 2 session of physical therapy but the pain became so bad that she could not continue.   Pt endorses bilateral low back pain that radiates down her left leg. She reports the pain now travels all the way down to the top of her foot and she has noticed new numbness/tingling on the top of her foot as well.   Pt denies weakness but reports her leg does sometimes feel like it is going to buckle when she gets sudden bouts of pain.   Upon chart review, potential next steps may be EMG and/or injection.   Advice given to patient: Informed pt that provider would be updated with her status. She will be called with next steps.   Provider to address: Please advise.

## 2022-02-21 NOTE — TELEPHONE ENCOUNTER
"Per Devorah Vela CNP, \"Order placed for left lower extremity EMG with Dr. Nesbitt as well as left L5-S1 TFESI with Dr. Mares.  Okay to schedule both at this time.\"    Call placed to pt with provider's response. Pt stated understanding. Pt ok to proceed with EMG and injection. Injection requirements reviewed. Transferred pt to scheduling to make appts.     "

## 2022-03-01 ENCOUNTER — OFFICE VISIT (OUTPATIENT)
Dept: PHYSICAL MEDICINE AND REHAB | Facility: CLINIC | Age: 77
End: 2022-03-01
Attending: NURSE PRACTITIONER
Payer: COMMERCIAL

## 2022-03-01 ENCOUNTER — TELEPHONE (OUTPATIENT)
Dept: PHYSICAL MEDICINE AND REHAB | Facility: CLINIC | Age: 77
End: 2022-03-01

## 2022-03-01 DIAGNOSIS — M54.16 LEFT LUMBAR RADICULOPATHY: ICD-10-CM

## 2022-03-01 DIAGNOSIS — M48.061 LUMBAR FORAMINAL STENOSIS: ICD-10-CM

## 2022-03-01 PROCEDURE — 95909 NRV CNDJ TST 5-6 STUDIES: CPT | Performed by: PHYSICAL MEDICINE & REHABILITATION

## 2022-03-01 PROCEDURE — 95886 MUSC TEST DONE W/N TEST COMP: CPT | Mod: LT | Performed by: PHYSICAL MEDICINE & REHABILITATION

## 2022-03-01 NOTE — TELEPHONE ENCOUNTER
----- Message from Devorah Vela CNP sent at 3/1/2022  1:00 PM CST -----  Regarding: EMG results  Please call patient and notify her that her EMG results showed no active or chronic nerve damage which is good.Dr. Nesbitt did mention that she is feeling much better.  If this is the case she can cancel her upcoming injection with Dr. Mares.However if her pain is still significant and would be reasonable to move forward with the injection.I see she did recently do physical therapy session as well, I recommend continuing with home exercises even if her pain is better at this time to prevent progressive spinal issues and future flareups in pain.    Thanks,   Devorah

## 2022-03-01 NOTE — PROGRESS NOTES
Patient presents at the request of Devorah Vela for left lower extremity EMG.  She has a low back pain with left lower extremity radicular pain on the lateral posterior thigh to the lateral calf with some paresthesias to the top of the foot.  On exam she has mild decrease sensation to light touch over the left lateral calf and dorsal left foot.  She has normal muscle strength at the major muscle groups of the lower extremities.  1+ patellar 0 Achilles reflexes bilaterally.    EMG/NCS  results: Please see scanned full report.    Comment NCS: Essentially normal study  1.  Absent left sural SNAP and bilateral superficial peroneal SNAPs normal for patient's age.  2.  Normal left peroneal CMAP and tibial CMAP.    Comment EMG: Normal study  1.  Normal needle EMG left lower extremity.    Interpretation: Essentially normal study: There is electrodiagnostic evidence of:    1.  Absent bilateral lower extremity sensory nerve action potentials.  This can be normal for the patient's age, but I cannot completely exclude a mild sensory or sensorimotor peripheral polyneuropathy.      2.  There is no electrodiagnostic evidence of lumbosacral radiculopathy, lumbosacral plexopathy, or focal neuropathy in the left lower extremity.    The testing was completed in its entirety by the physician.      It was our pleasure caring for your patient today, if there any questions or concerns please do not hesitate to contact us.

## 2022-03-01 NOTE — TELEPHONE ENCOUNTER
Call placed to patient with provider's results and recommendations.  Pt stated understanding. Pt reports her pain is significantly better. She reports previously her pain was 20/10. Pt reports her pain is now 3-4/10 and it is improving with each day. She states she is now able to walk better and can go up and down steps with little pain.     Pt will cancel her injection appt at this time and continue with physical therapy exercises. She will call at any time with any questions/concerns or worsening symptoms.

## 2022-03-01 NOTE — PATIENT INSTRUCTIONS
Thank you for choosing the [facility] for your EMG testing.    The ordering provider will receive your final EMG results within the next few days.  Please follow up with your provider for the results and further treatment recommendations.

## 2022-03-01 NOTE — LETTER
3/1/2022         RE: Eli Vivas  [address]  [place] [address]        Dear Colleague,    Thank you for referring your patient, Eli Vivas, to the [facility] [place]. Please see a copy of my visit note below.    Patient presents at the request of Devorah Vela for left lower extremity EMG.  She has a low back pain with left lower extremity radicular pain on the lateral posterior thigh to the lateral calf with some paresthesias to the top of the foot.  On exam she has mild decrease sensation to light touch over the left lateral calf and dorsal left foot.  She has normal muscle strength at the major muscle groups of the lower extremities.  1+ patellar 0 Achilles reflexes bilaterally.    EMG/NCS  results: Please see scanned full report.    Comment NCS: Essentially normal study  1.  Absent left sural SNAP and bilateral superficial peroneal SNAPs normal for patient's age.  2.  Normal left peroneal CMAP and tibial CMAP.    Comment EMG: Normal study  1.  Normal needle EMG left lower extremity.    Interpretation: Essentially normal study: There is electrodiagnostic evidence of:    1.  Absent bilateral lower extremity sensory nerve action potentials.  This can be normal for the patient's age, but I cannot completely exclude a mild sensory or sensorimotor peripheral polyneuropathy.      2.  There is no electrodiagnostic evidence of lumbosacral radiculopathy, lumbosacral plexopathy, or focal neuropathy in the left lower extremity.    The testing was completed in its entirety by the physician.      It was our pleasure caring for your patient today, if there any questions or concerns please do not hesitate to contact us.            Again, thank you for allowing me to participate in the care of your patient.        Sincerely,        Robbie Nesbitt, DO

## 2022-05-13 ENCOUNTER — TRANSFERRED RECORDS (OUTPATIENT)
Dept: HEALTH INFORMATION MANAGEMENT | Facility: CLINIC | Age: 77
End: 2022-05-13

## 2022-05-13 ENCOUNTER — LAB REQUISITION (OUTPATIENT)
Dept: LAB | Facility: CLINIC | Age: 77
End: 2022-05-13
Payer: COMMERCIAL

## 2022-05-13 DIAGNOSIS — Z01.818 ENCOUNTER FOR OTHER PREPROCEDURAL EXAMINATION: ICD-10-CM

## 2022-05-13 LAB
ALBUMIN SERPL-MCNC: 3.8 G/DL (ref 3.5–5)
ALP SERPL-CCNC: 69 U/L (ref 45–120)
ALT SERPL W P-5'-P-CCNC: 15 U/L (ref 0–45)
ANION GAP SERPL CALCULATED.3IONS-SCNC: 12 MMOL/L (ref 5–18)
AST SERPL W P-5'-P-CCNC: 19 U/L (ref 0–40)
BILIRUB SERPL-MCNC: 0.6 MG/DL (ref 0–1)
BUN SERPL-MCNC: 14 MG/DL (ref 8–28)
CALCIUM SERPL-MCNC: 9.9 MG/DL (ref 8.5–10.5)
CHLORIDE BLD-SCNC: 106 MMOL/L (ref 98–107)
CO2 SERPL-SCNC: 25 MMOL/L (ref 22–31)
CREAT SERPL-MCNC: 0.8 MG/DL (ref 0.6–1.1)
GFR SERPL CREATININE-BSD FRML MDRD: 75 ML/MIN/1.73M2
GLUCOSE BLD-MCNC: 103 MG/DL (ref 70–125)
POTASSIUM BLD-SCNC: 4.3 MMOL/L (ref 3.5–5)
PROT SERPL-MCNC: 6.6 G/DL (ref 6–8)
SODIUM SERPL-SCNC: 143 MMOL/L (ref 136–145)

## 2022-05-13 PROCEDURE — 80053 COMPREHEN METABOLIC PANEL: CPT | Mod: ORL | Performed by: FAMILY MEDICINE

## 2022-05-31 DIAGNOSIS — G47.33 OBSTRUCTIVE SLEEP APNEA: Primary | ICD-10-CM

## 2022-10-12 DIAGNOSIS — Z79.811 AROMATASE INHIBITOR USE: ICD-10-CM

## 2022-10-12 DIAGNOSIS — C50.411 MALIGNANT NEOPLASM OF UPPER-OUTER QUADRANT OF RIGHT BREAST IN FEMALE, ESTROGEN RECEPTOR POSITIVE (H): Primary | ICD-10-CM

## 2022-10-12 DIAGNOSIS — M85.89 OSTEOPENIA OF MULTIPLE SITES: Primary | ICD-10-CM

## 2022-10-12 DIAGNOSIS — Z17.0 MALIGNANT NEOPLASM OF UPPER-OUTER QUADRANT OF RIGHT BREAST IN FEMALE, ESTROGEN RECEPTOR POSITIVE (H): Primary | ICD-10-CM

## 2022-10-12 DIAGNOSIS — M85.89 OSTEOPENIA OF MULTIPLE SITES: ICD-10-CM

## 2022-11-09 ENCOUNTER — DOCUMENTATION ONLY (OUTPATIENT)
Dept: RESPIRATORY THERAPY | Facility: CLINIC | Age: 77
End: 2022-11-09

## 2022-11-09 NOTE — PROGRESS NOTES
TREATMENT PLAN AND GOALS: PATIENT INSTRUCTED ON USE AND CARE OF THE PAP EQUIPMENT IN ACCORDANCE WITH THE [facility] PRACTICE GUIDELINES.  MACHINE MODEL AND MODE: REDMED AIRCURVE 10  PRESSURE SETTING OF: EPAP MIN-MAX 6-15 PS MIN-MAX 0-10  RX SIGNED BY: KYLAH TRIMBLE  REFERRAL SOURCE:  DATE RX SIGNED: 10/26/2022  TRI = 99  PT HAD BEEN ON A RESPIRONICS BEFORE, SO SHE WAS SOMEWHAT FAMILIAR WITH USING A SLEEP THERAPY DEVICE. SHE UNDERSTOOD THE SUPPLY SCHEDULE AND THE CARE INSTRUCTIONS. I SHOWED HER HOW  TO POWER IT ON/OFF, CONNECT THE TUBING AND MASK, AND HOW TO SCROLL THROUGH THE MENU IF SHE WANTED TO CHANGE SOME COMFORT SETTINGS. WE WENT OVER COMPLIANCE AND PT UNDERSTOOD SHE'LL HAVE TO COME  BACK TO THE SHOWROOM TO EXCHANGE THE SD CARD SO WE CAN DOWLOAD THE COMPLIANCE REPORT. SHE HAS [facility] NUMBER IF SHE HAS ANY FURTHER QUESTIONS.

## 2022-11-18 ENCOUNTER — HOSPITAL ENCOUNTER (OUTPATIENT)
Dept: CARDIOLOGY | Facility: HOSPITAL | Age: 77
Discharge: HOME | End: 2022-11-18
Attending: INTERNAL MEDICINE | Admitting: INTERNAL MEDICINE
Payer: COMMERCIAL

## 2022-11-18 DIAGNOSIS — I35.0 NONRHEUMATIC AORTIC VALVE STENOSIS: ICD-10-CM

## 2022-11-18 DIAGNOSIS — I10 ESSENTIAL HYPERTENSION: ICD-10-CM

## 2022-11-18 LAB — LVEF ECHO: NORMAL

## 2022-11-18 PROCEDURE — 93306 TTE W/DOPPLER COMPLETE: CPT

## 2022-11-18 PROCEDURE — 93306 TTE W/DOPPLER COMPLETE: CPT | Mod: 26 | Performed by: INTERNAL MEDICINE

## 2022-11-21 ENCOUNTER — TELEPHONE (OUTPATIENT)
Dept: CARDIOLOGY | Facility: CLINIC | Age: 77
End: 2022-11-21

## 2022-11-21 DIAGNOSIS — R60.9 EDEMA, UNSPECIFIED TYPE: ICD-10-CM

## 2022-11-21 DIAGNOSIS — I35.0 NONRHEUMATIC AORTIC VALVE STENOSIS: Primary | ICD-10-CM

## 2022-11-21 DIAGNOSIS — I10 ESSENTIAL HYPERTENSION: ICD-10-CM

## 2022-11-21 DIAGNOSIS — E78.5 DYSLIPIDEMIA: ICD-10-CM

## 2022-11-21 NOTE — TELEPHONE ENCOUNTER
[facility] Call Center    Phone Message    May a detailed message be left on voicemail: yes     Reason for Call: Other: Patient is wondering if she can get a copy of her echocardiogram mailed to her.      Action Taken: Other: cardiogram    Travel Screening: Not Applicable   Thank you!  [facility]

## 2022-12-02 ENCOUNTER — LAB REQUISITION (OUTPATIENT)
Dept: LAB | Facility: CLINIC | Age: 77
End: 2022-12-02
Payer: COMMERCIAL

## 2022-12-02 DIAGNOSIS — Z00.00 ENCOUNTER FOR GENERAL ADULT MEDICAL EXAMINATION WITHOUT ABNORMAL FINDINGS: ICD-10-CM

## 2022-12-02 DIAGNOSIS — M85.80 OTHER SPECIFIED DISORDERS OF BONE DENSITY AND STRUCTURE, UNSPECIFIED SITE: ICD-10-CM

## 2022-12-02 PROCEDURE — 80053 COMPREHEN METABOLIC PANEL: CPT | Mod: ORL | Performed by: FAMILY MEDICINE

## 2022-12-02 PROCEDURE — 80061 LIPID PANEL: CPT | Mod: ORL | Performed by: FAMILY MEDICINE

## 2022-12-02 PROCEDURE — 84443 ASSAY THYROID STIM HORMONE: CPT | Mod: ORL | Performed by: FAMILY MEDICINE

## 2022-12-03 LAB
ALBUMIN SERPL BCG-MCNC: 4.5 G/DL (ref 3.5–5.2)
ALP SERPL-CCNC: 85 U/L (ref 35–104)
ALT SERPL W P-5'-P-CCNC: 19 U/L (ref 10–35)
ANION GAP SERPL CALCULATED.3IONS-SCNC: 16 MMOL/L (ref 7–15)
AST SERPL W P-5'-P-CCNC: 23 U/L (ref 10–35)
BILIRUB SERPL-MCNC: 0.5 MG/DL
BUN SERPL-MCNC: 11.7 MG/DL (ref 8–23)
CALCIUM SERPL-MCNC: 10 MG/DL (ref 8.8–10.2)
CHLORIDE SERPL-SCNC: 104 MMOL/L (ref 98–107)
CHOLEST SERPL-MCNC: 198 MG/DL
CREAT SERPL-MCNC: 0.71 MG/DL (ref 0.51–0.95)
DEPRECATED HCO3 PLAS-SCNC: 23 MMOL/L (ref 22–29)
GFR SERPL CREATININE-BSD FRML MDRD: 87 ML/MIN/1.73M2
GLUCOSE SERPL-MCNC: 108 MG/DL (ref 70–99)
HDLC SERPL-MCNC: 58 MG/DL
LDLC SERPL CALC-MCNC: 113 MG/DL
NONHDLC SERPL-MCNC: 140 MG/DL
POTASSIUM SERPL-SCNC: 4.4 MMOL/L (ref 3.4–5.3)
PROT SERPL-MCNC: 7.1 G/DL (ref 6.4–8.3)
SODIUM SERPL-SCNC: 143 MMOL/L (ref 136–145)
TRIGL SERPL-MCNC: 136 MG/DL
TSH SERPL DL<=0.005 MIU/L-ACNC: 0.41 UIU/ML (ref 0.3–4.2)

## 2022-12-12 ENCOUNTER — OFFICE VISIT (OUTPATIENT)
Dept: CARDIOLOGY | Facility: CLINIC | Age: 77
End: 2022-12-12
Payer: COMMERCIAL

## 2022-12-12 VITALS
DIASTOLIC BLOOD PRESSURE: 86 MMHG | OXYGEN SATURATION: 97 % | RESPIRATION RATE: 16 BRPM | SYSTOLIC BLOOD PRESSURE: 154 MMHG | BODY MASS INDEX: 41.95 KG/M2 | HEART RATE: 76 BPM | WEIGHT: 222 LBS

## 2022-12-12 DIAGNOSIS — R60.0 BILATERAL LOWER EXTREMITY EDEMA: ICD-10-CM

## 2022-12-12 DIAGNOSIS — I35.0 NONRHEUMATIC AORTIC VALVE STENOSIS: ICD-10-CM

## 2022-12-12 DIAGNOSIS — E78.5 DYSLIPIDEMIA: ICD-10-CM

## 2022-12-12 DIAGNOSIS — I10 HYPERTENSION, UNSPECIFIED TYPE: Primary | ICD-10-CM

## 2022-12-12 PROCEDURE — 99214 OFFICE O/P EST MOD 30 MIN: CPT | Performed by: INTERNAL MEDICINE

## 2022-12-12 RX ORDER — LOSARTAN POTASSIUM 25 MG/1
25 TABLET ORAL DAILY
COMMUNITY
Start: 2022-12-09

## 2022-12-12 NOTE — PROGRESS NOTES
[facility] HEART CARE [address], [place] [address]   [email or web address]   OFFICE: [phone]          Thank you Vinny Yeung for asking the [facility] Heart Care team to participate in the care of your patient, Eli Vivas.     Impression and Plan     1.  Aortic stenosis.  Eli was felt to have mild aortic stenosis on more distant echocardiogram.  Echocardiogram 23 May 2016, 13 December 2019, and most recently 18 November 2022; did not reveal any aortic stenosis of significance.  This is commensurate with exam.    2. Dyslipidemia.  Lipid profile 2 December 2022 revealed  mg/dL and HDL 58 mg/dL.  Parenthetically, CT coronary angiogram 20 April 2017 revealed no evidence of coronary disease and a total Agatston score of 0.  Plan to continue atorvastatin 10 mg daily.     3. Lower extremity edema. This too has been stable and has primarily been related to orthopedic issues.  Specifically, she had suffered a fracture to her left ankle and had knee surgery on the right.     4.  Hypertension.  Blood pressure somewhat elevated today.  Eli, however, checks her blood pressure routinely at home and invariably has been having favorable readings.  She did bring in her blood pressure monitoring device to the clinic today and it would appear that her readings do correlate well with what we were able to get with the wall manometer.  Consequently, suspect her blood pressure readings at home are accurate and therefore did not make any changes today.     Plan on follow-up in 1 year.    35 minutes spent reviewing prior records (including documentation, laboratory studies, cardiac testing/imaging), interview with patient along with physical exam, planning, and subsequent documentation/crafting of note).           History of Present Illness    Once again I would like to thank you again for asking me to participate in the care of your patient, Eli NEWELL  Sangeetha.  As you know, but to reiterate for my own records, Eli Vivas is a 77 year old female with history of aortic stenosis, albeit felt mild on a prior echocardiogram.     On interview, Eli is without significant cardiovascular complaint.  She specifically denies chest pain or shortness of breath.  She denies palpitations or lightheadedness.  She does have chronic lower extremity edema though this is been stable.  Denies any fevers, chills, or other constitutional symptoms.    Further review of systems is otherwise negative/noncontributory (medical record and 13 point review of systems reviewed as well and pertinent positives noted).         Cardiac Diagnostics      Echocardiogram 18 November 2022:  Normal left ventricular size and systolic performance with ejection fraction of 55 to 60%.  No significant valvular heart disease.  Normal right ventricular size and systolic performance.  Normal atrial dimensions.  Color Doppler interrogation suggest the presence of a patent foramen ovale (PFO).     Echocardiogram 4 December 2019 (personally reviewed):  Normal left ventricular size and systolic performance with a visually estimated ejection fraction of 65-70%.   No significant valvular heart disease is identified on this study.   Normal right ventricular size and systolic performance.   When compared to the prior real-time echocardiogram dated 23 May 2016, there has been little appreciable interval change.     Echocardiogram 23 May 2016:  Normal left ventricular size and systolic performance with ejection fraction of 60%.  No significant valvular heart disease.  Normal right ventricular size and systolic performance.  Normal atrial dimensions.    Echocardiogram 29 September 2011:  Borderline left ventricular enlargement with normal left ventricular systolic performance.  Ejection fraction 65%.  Mild concentric increase in left ventricular wall thickness.  Mild aortic stenosis.  Mild to moderate aortic  insufficiency.  Borderline aortic root enlargement.  Normal right ventricular size and systolic performance.    Exercise nuclear perfusion study 8 June 2016:  No evidence of infarct or ischemia.  Normal left ventricular systolic performance with ejection fraction of 51%.    CT coronary angiogram 20 April 2017:  The total Agatston score is 0.  Normal coronary anatomy.  No significant obstructive coronary disease.     12-lead ECG (personally reviewed) 29 March 2017: Sinus rhythm with heart rate of 59 bpm.  Normal ECG.       Physical Examination       BP (!) 154/86 (BP Location: Right arm, Patient Position: Sitting, Cuff Size: Adult Large)   Pulse 76   Resp 16   Wt 100.7 kg (222 lb)   SpO2 97%   BMI 41.95 kg/m          Wt Readings from Last 3 Encounters:   12/12/22 100.7 kg (222 lb)   10/18/21 100.9 kg (222 lb 6.4 oz)   05/19/21 101.4 kg (223 lb 9.6 oz)       The patient is alert and oriented times three. Sclerae are anicteric. Mucosal membranes are moist. Jugular venous pressure is normal. No significant adenopathy/thyromegally appreciated. Lungs are clear with good expansion. On cardiovascular exam, the patient has a slightly irregular S1 and S2. Abdomen is soft and non-tender. Extremities reveal no clubbing, cyanosis, with mild to moderate bilateral lower extremity edema primarily at the ankles.         Medications  Allergies   Current Outpatient Medications   Medication Sig Dispense Refill     acetaminophen (TYLENOL) 500 MG tablet [ACETAMINOPHEN (TYLENOL) 500 MG TABLET] Take 500 mg by mouth as needed.        ascorbic acid (ASCORBIC ACID WITH MEHDI HIPS) 500 MG tablet [ASCORBIC ACID (ASCORBIC ACID WITH MEHDI HIPS) 500 MG TABLET] Take 500 mg by mouth daily.       aspirin 81 MG tablet [ASPIRIN 81 MG TABLET] Take 81 mg by mouth daily.       atorvastatin (LIPITOR) 10 MG tablet [ATORVASTATIN (LIPITOR) 10 MG TABLET] TAKE ONE TABLET BY MOUTH ONCE DAILY AT BEDTIME 90 tablet 1     CALCIUM CARBONATE-VITAMIN D3 ORAL  [CALCIUM CARBONATE-VITAMIN D3 ORAL] Take 1 tablet by mouth 2 (two) times a day.        cholecalciferol, vitamin D3, (VITAMIN D3) 2,000 unit cap [CHOLECALCIFEROL, VITAMIN D3, (VITAMIN D3) 2,000 UNIT CAP] Take 1 capsule by mouth daily.       fluocinonide (LIDEX) 0.05 % cream [FLUOCINONIDE (LIDEX) 0.05 % CREAM] BRITTNY EXT AA BID PRN  3     GLUCOSAMINE SULFATE ORAL [GLUCOSAMINE SULFATE ORAL] Take 2 tablets by mouth daily.        IBUPROFEN (ADVIL ORAL) [IBUPROFEN (ADVIL ORAL)] Take by mouth as needed.       levothyroxine 88 mcg cap [LEVOTHYROXINE 88 MCG CAP] Take 88 mcg by mouth daily.       losartan (COZAAR) 25 MG tablet Take 25 mg by mouth daily for blood pressure       MEDICATION CANNOT BE REORDERED - PLEASE MANUALLY REORDER AND DISCONTINUE THE OLD ORDER [GUAIFENESIN/DEXTROMETHORPHAN (ROBITUSSIN DM MAX ORAL)] Take by mouth as needed.       multivitamin (MULTIVITAMIN) per tablet [MULTIVITAMIN (MULTIVITAMIN) PER TABLET] Take 1 tablet by mouth daily.       omeprazole (PRILOSEC) 20 MG capsule [OMEPRAZOLE (PRILOSEC) 20 MG CAPSULE] Take 20 mg by mouth daily.        PSYLLIUM SEED, WITH SUGAR, (METAMUCIL ORAL) [PSYLLIUM SEED, WITH SUGAR, (METAMUCIL ORAL)] Take 15 mL by mouth daily as needed.        verapamil 200 mg CPCT [VERAPAMIL 200 MG CPCT] Take 200 mg by mouth at bedtime.              gabapentin (NEURONTIN) 100 MG capsule Take 1 capsule (100 mg) by mouth 3 times daily (Patient not taking: Reported on 12/12/2022) 90 capsule 3     pseudoephedrine HCl (SUDAFED 24 HR) 240 mg Tb24 tablet [PSEUDOEPHEDRINE HCL (SUDAFED 24 HR) 240 MG TB24 TABLET] Take 240 mg by mouth daily as needed. (Patient not taking: Reported on 10/18/2021)         Allergies   Allergen Reactions     Bextra [Valdecoxib] Other (See Comments)     Leg Pain     Simvastatin Other (See Comments)     Muscleaches     Vioxx [Rofecoxib] Other (See Comments)     Leg Pain     Adhesive [Mecrylate] Rash     Septra [Sulfamethoxazole W/Trimethoprim] Rash          Lab Results     Chemistry/lipid CBC Cardiac Enzymes/BNP/TSH/INR   Recent Labs   Lab Test 12/02/22  1134   CHOL 198   HDL 58   *   TRIG 136     Recent Labs   Lab Test 12/02/22  1134 11/30/21  0756 11/18/20  0835   * 94 83     Recent Labs   Lab Test 12/02/22  1134      POTASSIUM 4.4   CHLORIDE 104   CO2 23   *   BUN 11.7   CR 0.71   GFRESTIMATED 87   MARTA 10.0     Recent Labs   Lab Test 12/02/22  1134 05/13/22  0844 11/30/21  0756   CR 0.71 0.80 0.77     No results for input(s): A1C in the last 15290 hours.       No results for input(s): WBC, HGB, HCT, MCV, PLT in the last 70899 hours.  No results for input(s): HGB in the last 19954 hours. No results for input(s): TROPONINI in the last 84273 hours.  No results for input(s): BNP, NTBNPI, NTBNP in the last 38149 hours.  Recent Labs   Lab Test 12/02/22  1134   TSH 0.41     No results for input(s): INR in the last 73934 hours.     Medical History  Surgical History Family History Social History   Past Medical History:   Diagnosis Date     Breast cancer (H) 2011    ca     Chronic kidney disease      GERD (gastroesophageal reflux disease)      Hx of radiation therapy 2011     Hypertension      Hypothyroidism      Sleep apnea      Past Surgical History:   Procedure Laterality Date     BIOPSY BREAST Right 2011    ca     CATARACT EXTRACTION Bilateral      LUMPECTOMY BREAST Right 2011     Family History   Problem Relation Age of Onset     Emphysema Mother      Heart Disease Maternal Grandmother      Diabetes Paternal Grandmother      Breast Cancer Maternal Aunt      Breast Cancer Paternal Aunt      Hereditary Breast and Ovarian Cancer Syndrome No family hx of      Cancer No family hx of      Colon Cancer No family hx of      Endometrial Cancer No family hx of      Ovarian Cancer No family hx of         Social History     Socioeconomic History     Marital status: Single     Spouse name: Not on file     Number of children: Not on file     Years of education: Not on  file     Highest education level: Not on file   Occupational History     Not on file   Tobacco Use     Smoking status: Never     Smokeless tobacco: Never   Substance and Sexual Activity     Alcohol use: No     Drug use: No     Sexual activity: Never   Other Topics Concern     Not on file   Social History Narrative     Not on file     Social Determinants of Health     Financial Resource Strain: Not on file   Food Insecurity: Not on file   Transportation Needs: Not on file   Physical Activity: Not on file   Stress: Not on file   Social Connections: Not on file   Intimate Partner Violence: Not on file   Housing Stability: Not on file

## 2022-12-12 NOTE — LETTER
12/12/2022    Vinny Kapoor MD  [address]  [place] [address]    RE: Eli Vivas       Dear Colleague,     I had the pleasure of seeing Eli Vivas in the [facility].         [facility]   [address], [place] [address]   [email or web address]   OFFICE: [phone]          Thank you Vinny Yeung for asking the [facility] Heart Care team to participate in the care of your patient, Eli Vivas.     Impression and Plan     1.  Aortic stenosis.  Eli was felt to have mild aortic stenosis on more distant echocardiogram.  Echocardiogram 23 May 2016, 13 December 2019, and most recently 18 November 2022; did not reveal any aortic stenosis of significance.  This is commensurate with exam.    2. Dyslipidemia.  Lipid profile 2 December 2022 revealed  mg/dL and HDL 58 mg/dL.  Parenthetically, CT coronary angiogram 20 April 2017 revealed no evidence of coronary disease and a total Agatston score of 0.    Plan to continue atorvastatin 10 mg daily.     3. Lower extremity edema. This too has been stable and has primarily been related to orthopedic issues.  Specifically, she had suffered a fracture to her left ankle and had knee surgery on the right.     4.  Hypertension.  Blood pressure somewhat elevated today.  Eli, however, checks her blood pressure routinely at home and invariably has been having favorable readings.  She did bring in her blood pressure monitoring device to the clinic today and it would appear that her readings do correlate well with what we were able to get with the wall manometer.  Consequently, suspect her blood pressure readings at home are accurate and therefore did not make any changes today.     Plan on follow-up in 1 year.    35 minutes spent reviewing prior records (including documentation, laboratory studies, cardiac testing/imaging), interview with patient along with physical exam,  planning, and subsequent documentation/crafting of note).           History of Present Illness    Once again I would like to thank you again for asking me to participate in the care of your patient, Eli Vivas.  As you know, but to reiterate for my own records, Eli Vivas is a 77 year old female with history of aortic stenosis, albeit felt mild on a prior echocardiogram.     On interview, Eli is without significant cardiovascular complaint.  She specifically denies chest pain or shortness of breath.  She denies palpitations or lightheadedness.  She does have chronic lower extremity edema though this is been stable.  Denies any fevers, chills, or other constitutional symptoms.    Further review of systems is otherwise negative/noncontributory (medical record and 13 point review of systems reviewed as well and pertinent positives noted).         Cardiac Diagnostics      Echocardiogram 18 November 2022:  1. Normal left ventricular size and systolic performance with ejection fraction of 55 to 60%.  2. No significant valvular heart disease.  3. Normal right ventricular size and systolic performance.  4. Normal atrial dimensions.  5. Color Doppler interrogation suggest the presence of a patent foramen ovale (PFO).     Echocardiogram 4 December 2019 (personally reviewed):  1. Normal left ventricular size and systolic performance with a visually estimated ejection fraction of 65-70%.   2. No significant valvular heart disease is identified on this study.   3. Normal right ventricular size and systolic performance.   o When compared to the prior real-time echocardiogram dated 23 May 2016, there has been little appreciable interval change.     Echocardiogram 23 May 2016:  1. Normal left ventricular size and systolic performance with ejection fraction of 60%.  2. No significant valvular heart disease.  3. Normal right ventricular size and systolic performance.  4. Normal atrial dimensions.    Echocardiogram  29 September 2011:  1. Borderline left ventricular enlargement with normal left ventricular systolic performance.  Ejection fraction 65%.  2. Mild concentric increase in left ventricular wall thickness.  3. Mild aortic stenosis.  4. Mild to moderate aortic insufficiency.  5. Borderline aortic root enlargement.  6. Normal right ventricular size and systolic performance.    Exercise nuclear perfusion study 8 June 2016:  1. No evidence of infarct or ischemia.  2. Normal left ventricular systolic performance with ejection fraction of 51%.    CT coronary angiogram 20 April 2017:  1. The total Agatston score is 0.  2. Normal coronary anatomy.  No significant obstructive coronary disease.     12-lead ECG (personally reviewed) 29 March 2017: Sinus rhythm with heart rate of 59 bpm.  Normal ECG.       Physical Examination       BP (!) 154/86 (BP Location: Right arm, Patient Position: Sitting, Cuff Size: Adult Large)   Pulse 76   Resp 16   Wt 100.7 kg (222 lb)   SpO2 97%   BMI 41.95 kg/m          Wt Readings from Last 3 Encounters:   12/12/22 100.7 kg (222 lb)   10/18/21 100.9 kg (222 lb 6.4 oz)   05/19/21 101.4 kg (223 lb 9.6 oz)       The patient is alert and oriented times three. Sclerae are anicteric. Mucosal membranes are moist. Jugular venous pressure is normal. No significant adenopathy/thyromegally appreciated. Lungs are clear with good expansion. On cardiovascular exam, the patient has a slightly irregular S1 and S2. Abdomen is soft and non-tender. Extremities reveal no clubbing, cyanosis, with mild to moderate bilateral lower extremity edema primarily at the ankles.         Medications  Allergies   Current Outpatient Medications   Medication Sig Dispense Refill     acetaminophen (TYLENOL) 500 MG tablet [ACETAMINOPHEN (TYLENOL) 500 MG TABLET] Take 500 mg by mouth as needed.        ascorbic acid (ASCORBIC ACID WITH MEHDI HIPS) 500 MG tablet [ASCORBIC ACID (ASCORBIC ACID WITH MEHDI HIPS) 500 MG TABLET] Take 500 mg by  mouth daily.       aspirin 81 MG tablet [ASPIRIN 81 MG TABLET] Take 81 mg by mouth daily.       atorvastatin (LIPITOR) 10 MG tablet [ATORVASTATIN (LIPITOR) 10 MG TABLET] TAKE ONE TABLET BY MOUTH ONCE DAILY AT BEDTIME 90 tablet 1     CALCIUM CARBONATE-VITAMIN D3 ORAL [CALCIUM CARBONATE-VITAMIN D3 ORAL] Take 1 tablet by mouth 2 (two) times a day.        cholecalciferol, vitamin D3, (VITAMIN D3) 2,000 unit cap [CHOLECALCIFEROL, VITAMIN D3, (VITAMIN D3) 2,000 UNIT CAP] Take 1 capsule by mouth daily.       fluocinonide (LIDEX) 0.05 % cream [FLUOCINONIDE (LIDEX) 0.05 % CREAM] BRITTNY EXT AA BID PRN  3     GLUCOSAMINE SULFATE ORAL [GLUCOSAMINE SULFATE ORAL] Take 2 tablets by mouth daily.        IBUPROFEN (ADVIL ORAL) [IBUPROFEN (ADVIL ORAL)] Take by mouth as needed.       levothyroxine 88 mcg cap [LEVOTHYROXINE 88 MCG CAP] Take 88 mcg by mouth daily.       losartan (COZAAR) 25 MG tablet Take 25 mg by mouth daily for blood pressure       MEDICATION CANNOT BE REORDERED - PLEASE MANUALLY REORDER AND DISCONTINUE THE OLD ORDER [GUAIFENESIN/DEXTROMETHORPHAN (ROBITUSSIN DM MAX ORAL)] Take by mouth as needed.       multivitamin (MULTIVITAMIN) per tablet [MULTIVITAMIN (MULTIVITAMIN) PER TABLET] Take 1 tablet by mouth daily.       omeprazole (PRILOSEC) 20 MG capsule [OMEPRAZOLE (PRILOSEC) 20 MG CAPSULE] Take 20 mg by mouth daily.        PSYLLIUM SEED, WITH SUGAR, (METAMUCIL ORAL) [PSYLLIUM SEED, WITH SUGAR, (METAMUCIL ORAL)] Take 15 mL by mouth daily as needed.        verapamil 200 mg CPCT [VERAPAMIL 200 MG CPCT] Take 200 mg by mouth at bedtime.              gabapentin (NEURONTIN) 100 MG capsule Take 1 capsule (100 mg) by mouth 3 times daily (Patient not taking: Reported on 12/12/2022) 90 capsule 3     pseudoephedrine HCl (SUDAFED 24 HR) 240 mg Tb24 tablet [PSEUDOEPHEDRINE HCL (SUDAFED 24 HR) 240 MG TB24 TABLET] Take 240 mg by mouth daily as needed. (Patient not taking: Reported on 10/18/2021)         Allergies   Allergen Reactions      Bextra [Valdecoxib] Other (See Comments)     Leg Pain     Simvastatin Other (See Comments)     Muscleaches     Vioxx [Rofecoxib] Other (See Comments)     Leg Pain     Adhesive [Mecrylate] Rash     Septra [Sulfamethoxazole W/Trimethoprim] Rash          Lab Results    Chemistry/lipid CBC Cardiac Enzymes/BNP/TSH/INR   Recent Labs   Lab Test 12/02/22  1134   CHOL 198   HDL 58   *   TRIG 136     Recent Labs   Lab Test 12/02/22  1134 11/30/21  0756 11/18/20  0835   * 94 83     Recent Labs   Lab Test 12/02/22  1134      POTASSIUM 4.4   CHLORIDE 104   CO2 23   *   BUN 11.7   CR 0.71   GFRESTIMATED 87   MARTA 10.0     Recent Labs   Lab Test 12/02/22  1134 05/13/22  0844 11/30/21  0756   CR 0.71 0.80 0.77     No results for input(s): A1C in the last 33628 hours.       No results for input(s): WBC, HGB, HCT, MCV, PLT in the last 00910 hours.  No results for input(s): HGB in the last 62649 hours. No results for input(s): TROPONINI in the last 81257 hours.  No results for input(s): BNP, NTBNPI, NTBNP in the last 29120 hours.  Recent Labs   Lab Test 12/02/22  1134   TSH 0.41     No results for input(s): INR in the last 38458 hours.     Medical History  Surgical History Family History Social History   Past Medical History:   Diagnosis Date     Breast cancer (H) 2011    ca     Chronic kidney disease      GERD (gastroesophageal reflux disease)      Hx of radiation therapy 2011     Hypertension      Hypothyroidism      Sleep apnea      Past Surgical History:   Procedure Laterality Date     BIOPSY BREAST Right 2011    ca     CATARACT EXTRACTION Bilateral      LUMPECTOMY BREAST Right 2011     Family History   Problem Relation Age of Onset     Emphysema Mother      Heart Disease Maternal Grandmother      Diabetes Paternal Grandmother      Breast Cancer Maternal Aunt      Breast Cancer Paternal Aunt      Hereditary Breast and Ovarian Cancer Syndrome No family hx of      Cancer No family hx of      Colon Cancer No  family hx of      Endometrial Cancer No family hx of      Ovarian Cancer No family hx of         Social History     Socioeconomic History     Marital status: Single     Spouse name: Not on file     Number of children: Not on file     Years of education: Not on file     Highest education level: Not on file   Occupational History     Not on file   Tobacco Use     Smoking status: Never     Smokeless tobacco: Never   Substance and Sexual Activity     Alcohol use: No     Drug use: No     Sexual activity: Never   Other Topics Concern     Not on file   Social History Narrative     Not on file     Social Determinants of Health     Financial Resource Strain: Not on file   Food Insecurity: Not on file   Transportation Needs: Not on file   Physical Activity: Not on file   Stress: Not on file   Social Connections: Not on file   Intimate Partner Violence: Not on file   Housing Stability: Not on file                      Thank you for allowing me to participate in the care of your patient.      Sincerely,     Marybeth Floyd MD     [facility]  cc:   Marybeth Floyd MD  [address] [address]

## 2022-12-23 ENCOUNTER — ANCILLARY PROCEDURE (OUTPATIENT)
Dept: MAMMOGRAPHY | Facility: CLINIC | Age: 77
End: 2022-12-23
Attending: INTERNAL MEDICINE
Payer: COMMERCIAL

## 2022-12-23 ENCOUNTER — LAB REQUISITION (OUTPATIENT)
Dept: LAB | Facility: CLINIC | Age: 77
End: 2022-12-23
Payer: COMMERCIAL

## 2022-12-23 DIAGNOSIS — I10 ESSENTIAL (PRIMARY) HYPERTENSION: ICD-10-CM

## 2022-12-23 DIAGNOSIS — Z12.31 VISIT FOR SCREENING MAMMOGRAM: ICD-10-CM

## 2022-12-23 LAB
ANION GAP SERPL CALCULATED.3IONS-SCNC: 16 MMOL/L (ref 7–15)
BUN SERPL-MCNC: 13.9 MG/DL (ref 8–23)
CALCIUM SERPL-MCNC: 10.3 MG/DL (ref 8.8–10.2)
CHLORIDE SERPL-SCNC: 108 MMOL/L (ref 98–107)
CREAT SERPL-MCNC: 0.73 MG/DL (ref 0.51–0.95)
DEPRECATED HCO3 PLAS-SCNC: 22 MMOL/L (ref 22–29)
GFR SERPL CREATININE-BSD FRML MDRD: 84 ML/MIN/1.73M2
GLUCOSE SERPL-MCNC: 101 MG/DL (ref 70–99)
POTASSIUM SERPL-SCNC: 5 MMOL/L (ref 3.4–5.3)
SODIUM SERPL-SCNC: 146 MMOL/L (ref 136–145)

## 2022-12-23 PROCEDURE — 77067 SCR MAMMO BI INCL CAD: CPT

## 2022-12-23 PROCEDURE — 80048 BASIC METABOLIC PNL TOTAL CA: CPT | Mod: ORL | Performed by: FAMILY MEDICINE

## 2023-01-05 ENCOUNTER — DOCUMENTATION ONLY (OUTPATIENT)
Dept: RESPIRATORY THERAPY | Facility: CLINIC | Age: 78
End: 2023-01-05

## 2023-01-05 NOTE — PROGRESS NOTES
SPOKE WITH THE PT, SHE HAS AN APPT WITH  ON JAN 25. I TOLD HER FROM THERE, I'LL GET THE DR'S PROGRESS NOTES, AND THEN FORWARDED TO BILLING.

## 2023-02-17 ENCOUNTER — HOSPITAL ENCOUNTER (OUTPATIENT)
Dept: BONE DENSITY | Facility: HOSPITAL | Age: 78
Discharge: HOME | End: 2023-02-17
Attending: INTERNAL MEDICINE | Admitting: INTERNAL MEDICINE
Payer: COMMERCIAL

## 2023-02-17 DIAGNOSIS — M85.89 OSTEOPENIA OF MULTIPLE SITES: ICD-10-CM

## 2023-02-17 DIAGNOSIS — Z79.811 AROMATASE INHIBITOR USE: ICD-10-CM

## 2023-02-17 PROCEDURE — 77080 DXA BONE DENSITY AXIAL: CPT

## 2023-02-23 DIAGNOSIS — Z79.811 AROMATASE INHIBITOR USE: ICD-10-CM

## 2023-02-23 DIAGNOSIS — Z17.0 MALIGNANT NEOPLASM OF UPPER-OUTER QUADRANT OF RIGHT BREAST IN FEMALE, ESTROGEN RECEPTOR POSITIVE (H): Primary | ICD-10-CM

## 2023-02-23 DIAGNOSIS — C50.411 MALIGNANT NEOPLASM OF UPPER-OUTER QUADRANT OF RIGHT BREAST IN FEMALE, ESTROGEN RECEPTOR POSITIVE (H): Primary | ICD-10-CM

## 2023-02-27 ENCOUNTER — LAB (OUTPATIENT)
Dept: LAB | Facility: HOSPITAL | Age: 78
End: 2023-02-27
Attending: INTERNAL MEDICINE
Payer: COMMERCIAL

## 2023-02-27 ENCOUNTER — ONCOLOGY VISIT (OUTPATIENT)
Dept: ONCOLOGY | Facility: HOSPITAL | Age: 78
End: 2023-02-27
Attending: INTERNAL MEDICINE
Payer: COMMERCIAL

## 2023-02-27 VITALS
TEMPERATURE: 98.2 F | RESPIRATION RATE: 20 BRPM | OXYGEN SATURATION: 96 % | WEIGHT: 221 LBS | HEART RATE: 73 BPM | SYSTOLIC BLOOD PRESSURE: 147 MMHG | DIASTOLIC BLOOD PRESSURE: 77 MMHG | BODY MASS INDEX: 41.72 KG/M2 | HEIGHT: 61 IN

## 2023-02-27 DIAGNOSIS — C50.411 MALIGNANT NEOPLASM OF UPPER-OUTER QUADRANT OF RIGHT BREAST IN FEMALE, ESTROGEN RECEPTOR POSITIVE (H): ICD-10-CM

## 2023-02-27 DIAGNOSIS — C50.411 MALIGNANT NEOPLASM OF UPPER-OUTER QUADRANT OF RIGHT BREAST IN FEMALE, ESTROGEN RECEPTOR POSITIVE (H): Primary | ICD-10-CM

## 2023-02-27 DIAGNOSIS — Z17.0 MALIGNANT NEOPLASM OF UPPER-OUTER QUADRANT OF RIGHT BREAST IN FEMALE, ESTROGEN RECEPTOR POSITIVE (H): Primary | ICD-10-CM

## 2023-02-27 DIAGNOSIS — M85.89 OSTEOPENIA OF MULTIPLE SITES: ICD-10-CM

## 2023-02-27 DIAGNOSIS — Z79.811 AROMATASE INHIBITOR USE: ICD-10-CM

## 2023-02-27 DIAGNOSIS — Z17.0 MALIGNANT NEOPLASM OF UPPER-OUTER QUADRANT OF RIGHT BREAST IN FEMALE, ESTROGEN RECEPTOR POSITIVE (H): ICD-10-CM

## 2023-02-27 LAB
ANION GAP SERPL CALCULATED.3IONS-SCNC: 11 MMOL/L (ref 7–15)
BUN SERPL-MCNC: 13.2 MG/DL (ref 8–23)
CALCIUM SERPL-MCNC: 9.7 MG/DL (ref 8.8–10.2)
CHLORIDE SERPL-SCNC: 104 MMOL/L (ref 98–107)
CREAT SERPL-MCNC: 0.71 MG/DL (ref 0.51–0.95)
DEPRECATED HCO3 PLAS-SCNC: 27 MMOL/L (ref 22–29)
GFR SERPL CREATININE-BSD FRML MDRD: 87 ML/MIN/1.73M2
GLUCOSE SERPL-MCNC: 99 MG/DL (ref 70–99)
POTASSIUM SERPL-SCNC: 4.1 MMOL/L (ref 3.4–5.3)
SODIUM SERPL-SCNC: 142 MMOL/L (ref 136–145)

## 2023-02-27 PROCEDURE — 80048 BASIC METABOLIC PNL TOTAL CA: CPT

## 2023-02-27 PROCEDURE — 99214 OFFICE O/P EST MOD 30 MIN: CPT | Performed by: INTERNAL MEDICINE

## 2023-02-27 PROCEDURE — 36415 COLL VENOUS BLD VENIPUNCTURE: CPT

## 2023-02-27 PROCEDURE — G0463 HOSPITAL OUTPT CLINIC VISIT: HCPCS | Performed by: INTERNAL MEDICINE

## 2023-02-27 RX ORDER — AMOXICILLIN 500 MG/1
TABLET, FILM COATED ORAL
COMMUNITY
Start: 2023-02-07

## 2023-02-27 ASSESSMENT — PAIN SCALES - GENERAL: PAINLEVEL: NO PAIN (0)

## 2023-02-27 NOTE — PROGRESS NOTES
[facility] cancer Care Progress Note  Patient: Eli Vivas   MRN:  [id number]   Date of Service : Feb 27, 2023        Reason for visit      1. Malignant neoplasm of upper-outer quadrant of right breast in female, estrogen receptor positive (H)        Assessment     1. A 77 year old   woman postmenopausal CA breast, right side, T1 N0 M0, stage I, ER/NC positive, HER-2/breana negative, status post lumpectomy, radiation  and 2 years of tamoxifen on Arimidex from December 2013 till 2016.  Her performance status is 1.  Her last mammogram in December was fine.  2.   Mild osteopenia on her bone density.  3.   Normal endometrial thickening and no more dysfunctional uterine bleeding.  4.  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  5.  Long-term Prilosec use for gastroesophageal reflux disease.  6.  Fully Covid vaccinated.    Plan     1.  At this time continue with active surveillance.  She should get a yearly mammogram and yearly breast exams.  2.  Continue good diet and exercise.  She should increase calcium in her diet and take some vitamin D supplement as well.  3.  She should continue some exercises if she can tolerate it.  4.  Follow-up with me in a years time if she chooses to do so.  5.  Continue to observe pandemic precautions.  6.  We also talked about some weight loss strategies.  I encouraged her to consider weight watchers.      Clinical stage      Breast cancer of upper-outer quadrant of right female breast    Primary site: Breast (Right)    Staging method: AJCC 7th Edition    Pathologic free text: ER+/NC+/HER2 -ve    Pathologic: Stage IA (T1c, N0, cM0) - Signed by Brett Grijalva MD on 8/13/2014    Summary: Stage IA (T1c, N0, cM0)      History     Eli Vivas is a very pleasant 77 year old  female with a history of breast cancer diagnosed in October of 2011 located on the right side, measuring 1.3 cm in size, present on a mammogram, ER/NC positive, HER-2/breana negative, grade 1 for which she underwent  radiation therapy and took tamoxifen until November of 2011.  Subsequent to that, after that we switched her to Arimidex. She continues to have some bone and joint side effects. She did try stopping Arimidex for a week but didn't really make much difference. In April 2014 she did have some spotting and then she had what she calls a menstrual period and then had it again in May 2014. She was evaluated by Dr. Kline from [facility] and she has been told she has thickened endometrium.  She has not had any problems since then. Finished 5 years of AI therapy in October 2016.    She is continuing yearly mammograms. Had bone density in September 2020 which showed slight improvement.  She did try getting off of Prilosec last year in 2018 but had significant rebound acidity that she went back on it.  She has some achy knees which prevent her from walking very much.    Comes in today for scheduled follow-up.  She is overall doing okay.  She had a bone density done recently as well.  Her last mammogram was in December and was normal.  She has been seen by her regular doctor as well as her cardiologist in the last year.  She also had a knee replacement done in the last year.  She is feeling Canniff achy in Joints.  Some neuropathy type symptoms in her right leg and left arm.  She was prescribed some gabapentin but she was afraid to take it so she attempted to recyclable.    Past Medical History     Past Medical History:   Diagnosis Date     Breast cancer (H) 2011    ca     Chronic kidney disease      GERD (gastroesophageal reflux disease)      Hx of radiation therapy 2011     Hypertension      Hypothyroidism      Sleep apnea        Review of Systems   A 14 point review of systems was obtained.  Positive findings noted in the history.  Rest of the review of system is otherwise negative.     ECOG performance status and Distress Assessment      ECOG Performance:    ECOG Performance Status: 1    Distress Assessment  Distress  "Assessment Score: No distress:     Pain Status  Currently in Pain: No/denies        Vital Signs     BP (!) 147/77 (BP Location: Left arm, Patient Position: Sitting, Cuff Size: Adult Large)   Pulse 73   Temp 98.2  F (36.8  C) (Oral)   Resp 20   Ht 1.537 m (5' 0.5\")   Wt 100.2 kg (221 lb)   SpO2 96%   BMI 42.45 kg/m       Physical Exam     GENERAL: No acute distress. Cooperative in conversation.   HEENT:  Pupils are equal, round and reactive. Oral mucosa is clean and intact. No ulcerations or mucositis noted. No bleeding noted.  RESP:Chest symmetric lungs are clear bilaterally per auscultation. Regular respiratory rate. No wheezes or rhonchi.  CV: Normal S1 S2 Regular, rate and rhythm. No murmurs.    ABD: Nondistended, soft, nontender. Positive bowel sounds. No organomegaly.   EXTREMITIES: No lower extremity edema.   NEURO: Non- focal. Alert and oriented x3.  Cranial nerves appear intact.  PSYCH: Within normal limits. No depression or anxiety.  SKIN: Warm dry intact.     Lab Results     Recent Results (from the past 240 hour(s))   Basic metabolic panel  (Ca, Cl, CO2, Creat, Gluc, K, Na, BUN)    Collection Time: 02/27/23 10:01 AM   Result Value Ref Range    Sodium 142 136 - 145 mmol/L    Potassium 4.1 3.4 - 5.3 mmol/L    Chloride 104 98 - 107 mmol/L    Carbon Dioxide (CO2) 27 22 - 29 mmol/L    Anion Gap 11 7 - 15 mmol/L    Urea Nitrogen 13.2 8.0 - 23.0 mg/dL    Creatinine 0.71 0.51 - 0.95 mg/dL    Calcium 9.7 8.8 - 10.2 mg/dL    Glucose 99 70 - 99 mg/dL    GFR Estimate 87 >60 mL/min/1.73m2        Imaging Results     DX Hip/Pelvis/Spine    Result Date: 2/17/2023  EXAM: DX HIP/PELVIS/SPINE LOCATION: [facility] DATE/TIME: 2/17/2023 8:12 AM INDICATION: Postmenopausal. High risk medication use. History of low bone density. COMPARISON: 9/10/2020, 5/9/2018. TECHNIQUE: Dual-energy x-ray absorptiometry performed with routine technique. FINDINGS: Lumbar Spine: L1-L4 (L3): BMD: 1.203 g/cm2. " T-score: 0.3. Z-score: 2.1 RIGHT Hip Total: BMD: 0.810 g/cm2. T-score: -1.6. Z-score: 0.3 RIGHT Hip Femoral neck: BMD: 0.769 g/cm2. T-score: -1.9. Z-score: 0.1 LEFT Hip Total: BMD: 0.866 g/cm2. T-score: -1.1. Z-score: 0.8 LEFT Hip Femoral neck: BMD: 0.860 g/cm2. T-score: -1.3. Z-score: 0.8 TBS L1-L4 (L3): 1.347. TBS T score: -1.3. TBS Z score: 1.4. WHO Criteria: Normal: T score at or above -1 SD Osteopenia: T score between -1 and -2.5 SD Osteoporosis: T score at or below -2.5 SD COMPARISON: There has been a 4.7% increase in lumbar spine BMD. There has been a 6.7% decrease in bilateral hip BMD. FRAX Results: 10 year probability of major osteoporotic fracture is 12.6%, and of hip fracture is 3.2%, based on right femoral neck BMD. RECOMMENDATIONS: Consider treatment if major osteoporotic fracture score is greater than or equal to 20%. Consider treatment if hip fracture score is greater than or equal to 3%.     IMPRESSION: Low bone density (OSTEOPENIA). T score meets the World Health Organization (WHO) criteria for low bone density (osteopenia) at one or more measured sites. The risk of osteoporotic fracture increased approximately two-fold for each SD decrease  in T-score.       Brett Grijalva MD

## 2023-02-27 NOTE — LETTER
"    2/27/2023         RE: Eli Vivas  [address] [facility] [address] [address]        Dear Colleague,    Thank you for referring your patient, Eli Vivas, to the [facility]. Please see a copy of my visit note below.    Oncology Rooming Note    February 27, 2023 11:10 AM   Eli Vivas is a 77 year old female who presents for:    Chief Complaint   Patient presents with     Oncology Clinic Visit     9 month return Malignant neoplasm of upper-outer quadrant of right breast in female, estrogen receptor positive, review labs      Initial Vitals: BP (!) 147/77 (BP Location: Left arm, Patient Position: Sitting, Cuff Size: Adult Large)   Pulse 73   Temp 98.2  F (36.8  C) (Oral)   Resp 20   Ht 1.537 m (5' 0.5\")   Wt 100.2 kg (221 lb)   SpO2 96%   BMI 42.45 kg/m   Estimated body mass index is 42.45 kg/m  as calculated from the following:    Height as of this encounter: 1.537 m (5' 0.5\").    Weight as of this encounter: 100.2 kg (221 lb). Body surface area is 2.07 meters squared.  No Pain (0) Comment: Data Unavailable   No LMP recorded.  Allergies reviewed: Yes  Medications reviewed: Yes    Medications: Medication refills not needed today.  Pharmacy name entered into [facility]: [id number] [address] AT [facility] [address]    Clinical concerns:   9 month return Malignant neoplasm of upper-outer quadrant of right breast in female, estrogen receptor positive, review labs     [facility]ero Kellogg [facility] cancer Care Progress Note  Patient: Eli Vivas   MRN:  [id number]   Date of Service : Feb 27, 2023        Reason for visit      1. Malignant neoplasm of upper-outer quadrant of right breast in female, estrogen receptor positive (H)        Assessment     1. A 77 year old   woman postmenopausal CA breast, right side, T1 N0 M0, stage I, ER/WV positive, HER-2/breana negative, status post lumpectomy, radiation  " and 2 years of tamoxifen on Arimidex from December 2013 till 2016.  Her performance status is 1.  Her last mammogram in December was fine.  2.   Mild osteopenia on her bone density.  3.   Normal endometrial thickening and no more dysfunctional uterine bleeding.  4.  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  5.  Long-term Prilosec use for gastroesophageal reflux disease.  6.  Fully Covid vaccinated.    Plan     1.  At this time continue with active surveillance.  She should get a yearly mammogram and yearly breast exams.  2.  Continue good diet and exercise.  She should increase calcium in her diet and take some vitamin D supplement as well.  3.  She should continue some exercises if she can tolerate it.  4.  Follow-up with me in a years time if she chooses to do so.  5.  Continue to observe pandemic precautions.  6.  We also talked about some weight loss strategies.  I encouraged her to consider weight watchers.      Clinical stage      Breast cancer of upper-outer quadrant of right female breast    Primary site: Breast (Right)    Staging method: AJCC 7th Edition    Pathologic free text: ER+/KS+/HER2 -ve    Pathologic: Stage IA (T1c, N0, cM0) - Signed by Brett Grijalva MD on 8/13/2014    Summary: Stage IA (T1c, N0, cM0)      History     Eli Vivas is a very pleasant 77 year old  female with a history of breast cancer diagnosed in October of 2011 located on the right side, measuring 1.3 cm in size, present on a mammogram, ER/KS positive, HER-2/breana negative, grade 1 for which she underwent radiation therapy and took tamoxifen until November of 2011.  Subsequent to that, after that we switched her to Arimidex. She continues to have some bone and joint side effects. She did try stopping Arimidex for a week but didn't really make much difference. In April 2014 she did have some spotting and then she had what she calls a menstrual period and then had it again in May 2014. She was evaluated by Dr. Kline from [facility] and she has been told she has thickened endometrium.  She has not had any problems since then. Finished 5 years of AI therapy in October 2016.    She is continuing yearly mammograms. Had bone density in September 2020 which showed slight improvement.  She did try getting off of Prilosec last year in 2018 but had significant rebound acidity that she went back on it.  She has some achy knees which prevent her from walking very much.    Comes in today for scheduled follow-up.  She is overall doing okay.  She had a bone density done recently as well.  Her last mammogram was in December and was normal.  She has been seen by her regular doctor as well as her cardiologist in the last year.  She also had a knee replacement done in the last year.  She is feeling Canniff achy in Joints.  Some neuropathy type symptoms in her right leg and left arm.  She was prescribed some gabapentin but she was afraid to take it so she attempted to recyclable.    Past Medical History     Past Medical History:   Diagnosis Date     Breast cancer (H) 2011    ca     Chronic kidney disease      GERD (gastroesophageal reflux disease)      Hx of radiation therapy 2011     Hypertension      Hypothyroidism      Sleep apnea        Review of Systems   A 14 point review of systems was obtained.  Positive findings noted in the history.  Rest of the review of system is otherwise negative.     ECOG performance status and Distress Assessment      ECOG Performance:    ECOG Performance Status: 1    Distress Assessment  Distress Assessment Score: No distress:     Pain Status  Currently in Pain: No/denies        Vital Signs     BP (!) 147/77 (BP Location: Left arm, Patient Position: Sitting, Cuff Size: Adult Large)   Pulse 73   Temp 98.2  F (36.8  C) (Oral)   Resp 20   Ht 1.537 m (5' 0.5\")   Wt 100.2 kg (221 lb)   SpO2 96%   BMI 42.45 kg/m       Physical Exam     GENERAL: No acute distress. Cooperative in conversation.   HEENT:  Pupils are equal, " round and reactive. Oral mucosa is clean and intact. No ulcerations or mucositis noted. No bleeding noted.  RESP:Chest symmetric lungs are clear bilaterally per auscultation. Regular respiratory rate. No wheezes or rhonchi.  CV: Normal S1 S2 Regular, rate and rhythm. No murmurs.    ABD: Nondistended, soft, nontender. Positive bowel sounds. No organomegaly.   EXTREMITIES: No lower extremity edema.   NEURO: Non- focal. Alert and oriented x3.  Cranial nerves appear intact.  PSYCH: Within normal limits. No depression or anxiety.  SKIN: Warm dry intact.     Lab Results     Recent Results (from the past 240 hour(s))   Basic metabolic panel  (Ca, Cl, CO2, Creat, Gluc, K, Na, BUN)    Collection Time: 02/27/23 10:01 AM   Result Value Ref Range    Sodium 142 136 - 145 mmol/L    Potassium 4.1 3.4 - 5.3 mmol/L    Chloride 104 98 - 107 mmol/L    Carbon Dioxide (CO2) 27 22 - 29 mmol/L    Anion Gap 11 7 - 15 mmol/L    Urea Nitrogen 13.2 8.0 - 23.0 mg/dL    Creatinine 0.71 0.51 - 0.95 mg/dL    Calcium 9.7 8.8 - 10.2 mg/dL    Glucose 99 70 - 99 mg/dL    GFR Estimate 87 >60 mL/min/1.73m2        Imaging Results     DX Hip/Pelvis/Spine    Result Date: 2/17/2023  EXAM: DX HIP/PELVIS/SPINE LOCATION: [facility] DATE/TIME: 2/17/2023 8:12 AM INDICATION: Postmenopausal. High risk medication use. History of low bone density. COMPARISON: 9/10/2020, 5/9/2018. TECHNIQUE: Dual-energy x-ray absorptiometry performed with routine technique. FINDINGS: Lumbar Spine: L1-L4 (L3): BMD: 1.203 g/cm2. T-score: 0.3. Z-score: 2.1 RIGHT Hip Total: BMD: 0.810 g/cm2. T-score: -1.6. Z-score: 0.3 RIGHT Hip Femoral neck: BMD: 0.769 g/cm2. T-score: -1.9. Z-score: 0.1 LEFT Hip Total: BMD: 0.866 g/cm2. T-score: -1.1. Z-score: 0.8 LEFT Hip Femoral neck: BMD: 0.860 g/cm2. T-score: -1.3. Z-score: 0.8 TBS L1-L4 (L3): 1.347. TBS T score: -1.3. TBS Z score: 1.4. WHO Criteria: Normal: T score at or above -1 SD Osteopenia: T score between -1 and -2.5  SD Osteoporosis: T score at or below -2.5 SD COMPARISON: There has been a 4.7% increase in lumbar spine BMD. There has been a 6.7% decrease in bilateral hip BMD. FRAX Results: 10 year probability of major osteoporotic fracture is 12.6%, and of hip fracture is 3.2%, based on right femoral neck BMD. RECOMMENDATIONS: Consider treatment if major osteoporotic fracture score is greater than or equal to 20%. Consider treatment if hip fracture score is greater than or equal to 3%.     IMPRESSION: Low bone density (OSTEOPENIA). T score meets the World Health Organization (WHO) criteria for low bone density (osteopenia) at one or more measured sites. The risk of osteoporotic fracture increased approximately two-fold for each SD decrease  in T-score.       Brett Grijalva MD           Again, thank you for allowing me to participate in the care of your patient.        Sincerely,        Brett Grijalva MD, MD

## 2023-02-27 NOTE — PROGRESS NOTES
"Oncology Rooming Note    February 27, 2023 11:10 AM   Eli Vivas is a 77 year old female who presents for:    Chief Complaint   Patient presents with     Oncology Clinic Visit     9 month return Malignant neoplasm of upper-outer quadrant of right breast in female, estrogen receptor positive, review labs      Initial Vitals: BP (!) 147/77 (BP Location: Left arm, Patient Position: Sitting, Cuff Size: Adult Large)   Pulse 73   Temp 98.2  F (36.8  C) (Oral)   Resp 20   Ht 1.537 m (5' 0.5\")   Wt 100.2 kg (221 lb)   SpO2 96%   BMI 42.45 kg/m   Estimated body mass index is 42.45 kg/m  as calculated from the following:    Height as of this encounter: 1.537 m (5' 0.5\").    Weight as of this encounter: 100.2 kg (221 lb). Body surface area is 2.07 meters squared.  No Pain (0) Comment: Data Unavailable   No LMP recorded.  Allergies reviewed: Yes  Medications reviewed: Yes    Medications: Medication refills not needed today.  Pharmacy name entered into [facility]: [facility] #[id number] [place], [place] - [address] AT [address]    Clinical concerns:   9 month return Malignant neoplasm of upper-outer quadrant of right breast in female, estrogen receptor positive, review labs     Vero Kellogg, CMA          '  "

## 2023-05-11 NOTE — ADDENDUM NOTE
Encounter addended by: Lorena Vasquez, PT on: 5/11/2023 2:01 PM   Actions taken: Clinical Note Signed, Flowsheet accepted, Episode resolved

## 2023-06-30 ENCOUNTER — LAB REQUISITION (OUTPATIENT)
Dept: LAB | Facility: CLINIC | Age: 78
End: 2023-06-30
Payer: COMMERCIAL

## 2023-06-30 DIAGNOSIS — Z01.818 ENCOUNTER FOR OTHER PREPROCEDURAL EXAMINATION: ICD-10-CM

## 2023-06-30 PROCEDURE — 80048 BASIC METABOLIC PNL TOTAL CA: CPT | Mod: ORL | Performed by: FAMILY MEDICINE

## 2023-07-01 LAB
ANION GAP SERPL CALCULATED.3IONS-SCNC: 11 MMOL/L (ref 7–15)
BUN SERPL-MCNC: 15.4 MG/DL (ref 8–23)
CALCIUM SERPL-MCNC: 9.7 MG/DL (ref 8.8–10.2)
CHLORIDE SERPL-SCNC: 107 MMOL/L (ref 98–107)
CREAT SERPL-MCNC: 0.8 MG/DL (ref 0.51–0.95)
DEPRECATED HCO3 PLAS-SCNC: 25 MMOL/L (ref 22–29)
GFR SERPL CREATININE-BSD FRML MDRD: 75 ML/MIN/1.73M2
GLUCOSE SERPL-MCNC: 101 MG/DL (ref 70–99)
POTASSIUM SERPL-SCNC: 4.3 MMOL/L (ref 3.4–5.3)
SODIUM SERPL-SCNC: 143 MMOL/L (ref 136–145)

## 2023-12-08 ENCOUNTER — LAB REQUISITION (OUTPATIENT)
Dept: LAB | Facility: CLINIC | Age: 78
End: 2023-12-08
Payer: COMMERCIAL

## 2023-12-08 LAB
ANION GAP SERPL CALCULATED.3IONS-SCNC: 11 MMOL/L (ref 7–15)
BUN SERPL-MCNC: 9.9 MG/DL (ref 8–23)
CALCIUM SERPL-MCNC: 10 MG/DL (ref 8.8–10.2)
CHLORIDE SERPL-SCNC: 105 MMOL/L (ref 98–107)
CHOLEST SERPL-MCNC: 161 MG/DL
CREAT SERPL-MCNC: 0.75 MG/DL (ref 0.51–0.95)
DEPRECATED HCO3 PLAS-SCNC: 27 MMOL/L (ref 22–29)
EGFRCR SERPLBLD CKD-EPI 2021: 81 ML/MIN/1.73M2
FASTING STATUS PATIENT QL REPORTED: ABNORMAL
GLUCOSE SERPL-MCNC: 104 MG/DL (ref 70–99)
HDLC SERPL-MCNC: 52 MG/DL
LDLC SERPL CALC-MCNC: 78 MG/DL
NONHDLC SERPL-MCNC: 109 MG/DL
POTASSIUM SERPL-SCNC: 4.5 MMOL/L (ref 3.4–5.3)
SODIUM SERPL-SCNC: 143 MMOL/L (ref 135–145)
TRIGL SERPL-MCNC: 154 MG/DL
TSH SERPL DL<=0.005 MIU/L-ACNC: 0.31 UIU/ML (ref 0.3–4.2)

## 2023-12-08 PROCEDURE — 84443 ASSAY THYROID STIM HORMONE: CPT | Mod: ORL | Performed by: FAMILY MEDICINE

## 2023-12-08 PROCEDURE — 80048 BASIC METABOLIC PNL TOTAL CA: CPT | Mod: ORL | Performed by: FAMILY MEDICINE

## 2023-12-08 PROCEDURE — 80061 LIPID PANEL: CPT | Mod: ORL | Performed by: FAMILY MEDICINE

## 2023-12-29 ENCOUNTER — ANCILLARY PROCEDURE (OUTPATIENT)
Dept: MAMMOGRAPHY | Facility: CLINIC | Age: 78
End: 2023-12-29
Attending: FAMILY MEDICINE
Payer: COMMERCIAL

## 2023-12-29 DIAGNOSIS — Z12.31 VISIT FOR SCREENING MAMMOGRAM: ICD-10-CM

## 2023-12-29 PROCEDURE — 77067 SCR MAMMO BI INCL CAD: CPT

## 2024-01-24 ENCOUNTER — LAB REQUISITION (OUTPATIENT)
Dept: LAB | Facility: CLINIC | Age: 79
End: 2024-01-24
Payer: COMMERCIAL

## 2024-01-24 DIAGNOSIS — E03.9 HYPOTHYROIDISM, UNSPECIFIED: ICD-10-CM

## 2024-01-24 LAB — TSH SERPL DL<=0.005 MIU/L-ACNC: 3.64 UIU/ML (ref 0.3–4.2)

## 2024-01-24 PROCEDURE — 84443 ASSAY THYROID STIM HORMONE: CPT | Mod: ORL | Performed by: FAMILY MEDICINE

## 2024-03-14 ENCOUNTER — ONCOLOGY VISIT (OUTPATIENT)
Dept: ONCOLOGY | Facility: HOSPITAL | Age: 79
End: 2024-03-14
Attending: INTERNAL MEDICINE
Payer: COMMERCIAL

## 2024-03-14 VITALS
OXYGEN SATURATION: 98 % | WEIGHT: 221.2 LBS | RESPIRATION RATE: 16 BRPM | HEIGHT: 61 IN | DIASTOLIC BLOOD PRESSURE: 68 MMHG | BODY MASS INDEX: 41.76 KG/M2 | HEART RATE: 52 BPM | SYSTOLIC BLOOD PRESSURE: 152 MMHG | TEMPERATURE: 98.2 F

## 2024-03-14 DIAGNOSIS — Z79.811 AROMATASE INHIBITOR USE: ICD-10-CM

## 2024-03-14 DIAGNOSIS — C50.411 MALIGNANT NEOPLASM OF UPPER-OUTER QUADRANT OF RIGHT BREAST IN FEMALE, ESTROGEN RECEPTOR POSITIVE (H): Primary | ICD-10-CM

## 2024-03-14 DIAGNOSIS — M85.89 OSTEOPENIA OF MULTIPLE SITES: ICD-10-CM

## 2024-03-14 DIAGNOSIS — Z17.0 MALIGNANT NEOPLASM OF UPPER-OUTER QUADRANT OF RIGHT BREAST IN FEMALE, ESTROGEN RECEPTOR POSITIVE (H): Primary | ICD-10-CM

## 2024-03-14 PROCEDURE — 99214 OFFICE O/P EST MOD 30 MIN: CPT | Performed by: INTERNAL MEDICINE

## 2024-03-14 PROCEDURE — G0463 HOSPITAL OUTPT CLINIC VISIT: HCPCS | Performed by: INTERNAL MEDICINE

## 2024-03-14 PROCEDURE — G2211 COMPLEX E/M VISIT ADD ON: HCPCS | Performed by: INTERNAL MEDICINE

## 2024-03-14 ASSESSMENT — PAIN SCALES - GENERAL: PAINLEVEL: NO PAIN (1)

## 2024-03-14 NOTE — PROGRESS NOTES
[facility] cancer Care Progress Note  Patient: Eli Vivas   MRN:  [id number]   Date of Service : Mar 14, 2024        Reason for visit      Problem List Items Addressed This Visit          Musculoskeletal and Integumentary    Osteopenia of multiple sites       Other    Malignant neoplasm of upper-outer quadrant of right breast in female, estrogen receptor positive (H) - Primary     Other Visit Diagnoses       Aromatase inhibitor use                 Assessment     1. A 78 year old   woman postmenopausal CA breast, right side, T1 N0 M0, stage I, ER/MT positive, HER-2/breana negative, status post lumpectomy, radiation  and 2 years of tamoxifen on Arimidex from December 2013 till 2016.  Her performance status is 1.  Her last mammogram in December was fine.  2.  Mild osteopenia on her bone density.  In 2023 there was about 5-7% decline in her bone density.  3.  History of slight endometrial thickening but no more dysfunctional uterine bleeding.  4.  Recent exacerbation of sciatica type of pain.  That caused her blood pressure to go up and she ended up in the emergency room.  Recently in February 2024 received some sort of injections on the back and that have significantly helped with the pain.  5.  Long-term Prilosec use for gastroesophageal reflux disease.  6.  Fully Covid vaccinated.    Plan and medical decision making     Reviewed notes from each unique source.  Reviewed each unique test.  Ordered tests if indicated.  Independently interpreted the results of lab tests and radiological exams.  Personally reviewed the images.    1.  At this time we will continue with active surveillance for her breast cancer.  She should get a mammogram yearly.  2.  Next bone density in February 2025.  3.  Diet rich in calcium and vitamin D.  Advised to also do some weight training exercises.  4.  Follow-up with regular doctor regarding other medical issues.  5.  She should observe pandemic precautions.  6.  Try to maintain  good weight.  7.  The longitudinal plan of care for the diagnosis(es)/condition(s) as documented were addressed during this visit. Due to the added complexity in care, I will continue to support Eli in the subsequent management and with ongoing continuity of care.       Clinical stage      Breast cancer of upper-outer quadrant of right female breast    Primary site: Breast (Right)    Staging method: AJCC 7th Edition    Pathologic free text: ER+/FL+/HER2 -ve    Pathologic: Stage IA (T1c, N0, cM0) - Signed by Brett Grijalva MD on 8/13/2014    Summary: Stage IA (T1c, N0, cM0)      History     Eli Vivas is a very pleasant 78 year old  female with right-sided breast cancer diagnosed in October of 2011 measuring 1.3 cm in size, present on a mammogram, ER/FL positive, HER-2/breana negative, grade 1 for which she underwent radiation therapy and took tamoxifen until November of 2013 and then switched her to Arimidex until 2016. In April 2014 she did have some spotting and then she had what she calls a menstrual period and then had it again in May 2014. She was [facility] and she was told she has thickened endometrium.  She has not had any problems since then. Finished 5 years of AI therapy in October 2016.    She is continuing yearly mammograms. Had bone density in September 2020 which showed slight improvement.  She did try getting off of Prilosec last year in 2018 but had significant rebound acidity that she went back on it.  She has some achy knees which prevent her from walking very much.    Comes in today for scheduled follow-up.  Her last mammogram was in December 2023 and was normal.  Her last bone density was in February 2023 and showed slight worsening of her bone density.  She was recently seen in the urgency room for headache and elevated blood pressure.  The blood pressure was elevated most likely from the back pain that she had from degenerative joint disease of the  "back.  Recently got injections done there which have helped her pain tremendously.  She also follows up with cardiology etc.    Past Medical History     Past Medical History:   Diagnosis Date    Breast cancer (H) 2011    ca    Chronic kidney disease     GERD (gastroesophageal reflux disease)     Hx of radiation therapy 2011    Hypertension     Hypothyroidism     Sleep apnea        Review of system      Details noted in the history of present illness.  A detailed review of systems is otherwise negative.      Physical exam        BP (!) 152/68 (BP Location: Left arm, Patient Position: Sitting, Cuff Size: Adult Large)   Pulse 52   Temp 98.2  F (36.8  C) (Oral)   Resp 16   Ht 1.537 m (5' 0.5\")   Wt 100.3 kg (221 lb 3.2 oz)   SpO2 98%   BMI 42.49 kg/m      GENERAL: No acute distress. Cooperative in conversation.   HEENT:  Pupils are equal, round and reactive. Oral mucosa is clean and intact. No ulcerations or mucositis noted. No bleeding noted.  RESP:Chest symmetric lungs are clear bilaterally per auscultation. Regular respiratory rate. No wheezes or rhonchi.  CV: Normal S1 S2 Regular, rate and rhythm.     ABD: Nondistended, soft, nontender. Positive bowel sounds. No organomegaly.   EXTREMITIES: No lower extremity edema.   NEURO: Non- focal. Alert and oriented x3.  Cranial nerves appear intact.  PSYCH: Within normal limits. No depression or anxiety.  SKIN: Warm dry intact.  BREAST: Bilateral breast examination is done.  Right breast is status postlumpectomy.  The scar is barely visible.  However there is a nodularity right under the scar which is stable.  Left breast is normal except for slight ecchymosis on the lower inner quadrant.  No other palpable lump.  No nipple discharge.  Bilateral axillary examination is negative.    Lab results Reviewed       Reviewed labs done in the emergency room in February 2024.    Imaging results Reviewed         Reviewed the CT scan she had done in [facility] in February " 2024.      Signed by: Brett Grijalva MD      This note has been dictated using voice recognition software. Any grammatical or context distortions are unintentional and inherent to the software

## 2024-03-14 NOTE — LETTER
"    3/14/2024         RE: Eli Vivas  [address] [facility] [address]        Dear Colleague,    Thank you for referring your patient, Eli Vivas, to the [facility]. Please see a copy of my visit note below.    Oncology Rooming Note    March 14, 2024 3:09 PM   Eli Vivas is a 78 year old female who presents for:    Chief Complaint   Patient presents with     Oncology Clinic Visit     Return visit 1 year. Malignant neoplasm of upper-outer quadrant of right breast in female, estrogen receptor positive.     Initial Vitals: BP (!) 152/68 (BP Location: Left arm, Patient Position: Sitting, Cuff Size: Adult Large)   Pulse 52   Temp 98.2  F (36.8  C) (Oral)   Resp 16   Ht 1.537 m (5' 0.5\")   Wt 100.3 kg (221 lb 3.2 oz)   SpO2 98%   BMI 42.49 kg/m   Estimated body mass index is 42.49 kg/m  as calculated from the following:    Height as of this encounter: 1.537 m (5' 0.5\").    Weight as of this encounter: 100.3 kg (221 lb 3.2 oz). Body surface area is 2.07 meters squared.  No Pain (1) Comment: Data Unavailable   No LMP recorded.  Allergies reviewed: Yes  Medications reviewed: Yes    Medications: Medication refills not needed today.  Pharmacy name entered into [facility]: [id number] [address] [facility] [address]    Frailty Screening:   Is the patient here for a new oncology consult visit in cancer care? 2. No      Clinical concerns: Return visit 1 year. Malignant neoplasm of upper-outer quadrant of right breast in female, estrogen receptor positive. Pain in right hip going down to foot 1/10.      Karen Fabian, [facility] cancer Care Progress Note  Patient: Eli Vivas   MRN:  [id number]   Date of Service : Mar 14, 2024        Reason for visit      Problem List Items Addressed This Visit          Musculoskeletal and Integumentary    Osteopenia of multiple sites       Other    " Malignant neoplasm of upper-outer quadrant of right breast in female, estrogen receptor positive (H) - Primary     Other Visit Diagnoses       Aromatase inhibitor use                 Assessment     1. A 78 year old   woman postmenopausal CA breast, right side, T1 N0 M0, stage I, ER/OK positive, HER-2/breana negative, status post lumpectomy, radiation  and 2 years of tamoxifen on Arimidex from December 2013 till 2016.  Her performance status is 1.  Her last mammogram in December was fine.  2.  Mild osteopenia on her bone density.  In 2023 there was about 5-7% decline in her bone density.  3.  History of slight endometrial thickening but no more dysfunctional uterine bleeding.  4.  Recent exacerbation of sciatica type of pain.  That caused her blood pressure to go up and she ended up in the emergency room.  Recently in February 2024 received some sort of injections on the back and that have significantly helped with the pain.  5.  Long-term Prilosec use for gastroesophageal reflux disease.  6.  Fully Covid vaccinated.    Plan and medical decision making     Reviewed notes from each unique source.  Reviewed each unique test.  Ordered tests if indicated.  Independently interpreted the results of lab tests and radiological exams.  Personally reviewed the images.    1.  At this time we will continue with active surveillance for her breast cancer.  She should get a mammogram yearly.  2.  Next bone density in February 2025.  3.  Diet rich in calcium and vitamin D.  Advised to also do some weight training exercises.  4.  Follow-up with regular doctor regarding other medical issues.  5.  She should observe pandemic precautions.  6.  Try to maintain good weight.  7.  The longitudinal plan of care for the diagnosis(es)/condition(s) as documented were addressed during this visit. Due to the added complexity in care, I will continue to support Eli in the subsequent management and with ongoing continuity of care.       Clinical  stage      Breast cancer of upper-outer quadrant of right female breast    Primary site: Breast (Right)    Staging method: AJCC 7th Edition    Pathologic free text: ER+/LA+/HER2 -ve    Pathologic: Stage IA (T1c, N0, cM0) - Signed by Brett Grijalva MD on 8/13/2014    Summary: Stage IA (T1c, N0, cM0)      History     Eli Vivas is a very pleasant 78 year old  female with right-sided breast cancer diagnosed in October of 2011 measuring 1.3 cm in size, present on a mammogram, ER/LA positive, HER-2/breana negative, grade 1 for which she underwent radiation therapy and took tamoxifen until November of 2013 and then switched her to Arimidex until 2016. In April 2014 she did have some spotting and then she had what she calls a menstrual period and then had it again in May 2014. She was evaluated by Dr. Kline from [facility] and she was told she has thickened endometrium.  She has not had any problems since then. Finished 5 years of AI therapy in October 2016.    She is continuing yearly mammograms. Had bone density in September 2020 which showed slight improvement.  She did try getting off of Prilosec last year in 2018 but had significant rebound acidity that she went back on it.  She has some achy knees which prevent her from walking very much.    Comes in today for scheduled follow-up.  Her last mammogram was in December 2023 and was normal.  Her last bone density was in February 2023 and showed slight worsening of her bone density.  She was recently seen in the urgency room for headache and elevated blood pressure.  The blood pressure was elevated most likely from the back pain that she had from degenerative joint disease of the back.  Recently got injections done there which have helped her pain tremendously.  She also follows up with cardiology etc.    Past Medical History     Past Medical History:   Diagnosis Date     Breast cancer (H) 2011    ca     Chronic kidney disease      GERD (gastroesophageal reflux  "disease)      Hx of radiation therapy 2011     Hypertension      Hypothyroidism      Sleep apnea        Review of system      Details noted in the history of present illness.  A detailed review of systems is otherwise negative.      Physical exam        BP (!) 152/68 (BP Location: Left arm, Patient Position: Sitting, Cuff Size: Adult Large)   Pulse 52   Temp 98.2  F (36.8  C) (Oral)   Resp 16   Ht 1.537 m (5' 0.5\")   Wt 100.3 kg (221 lb 3.2 oz)   SpO2 98%   BMI 42.49 kg/m      GENERAL: No acute distress. Cooperative in conversation.   HEENT:  Pupils are equal, round and reactive. Oral mucosa is clean and intact. No ulcerations or mucositis noted. No bleeding noted.  RESP:Chest symmetric lungs are clear bilaterally per auscultation. Regular respiratory rate. No wheezes or rhonchi.  CV: Normal S1 S2 Regular, rate and rhythm.     ABD: Nondistended, soft, nontender. Positive bowel sounds. No organomegaly.   EXTREMITIES: No lower extremity edema.   NEURO: Non- focal. Alert and oriented x3.  Cranial nerves appear intact.  PSYCH: Within normal limits. No depression or anxiety.  SKIN: Warm dry intact.  BREAST: Bilateral breast examination is done.  Right breast is status postlumpectomy.  The scar is barely visible.  However there is a nodularity right under the scar which is stable.  Left breast is normal except for slight ecchymosis on the lower inner quadrant.  No other palpable lump.  No nipple discharge.  Bilateral axillary examination is negative.    Lab results Reviewed       Reviewed labs done in the emergency room in February 2024.    Imaging results Reviewed         Reviewed the CT scan she had done in [facility] in February 2024.      Signed by: Brett Grijalva MD      This note has been dictated using voice recognition software. Any grammatical or context distortions are unintentional and inherent to the software          Again, thank you for allowing me to participate in the care of your patient.  "       Sincerely,        Brett Grijalva MD

## 2024-03-14 NOTE — PROGRESS NOTES
"Oncology Rooming Note    March 14, 2024 3:09 PM   Eli Vivas is a 78 year old female who presents for:    Chief Complaint   Patient presents with    Oncology Clinic Visit     Return visit 1 year. Malignant neoplasm of upper-outer quadrant of right breast in female, estrogen receptor positive.     Initial Vitals: BP (!) 152/68 (BP Location: Left arm, Patient Position: Sitting, Cuff Size: Adult Large)   Pulse 52   Temp 98.2  F (36.8  C) (Oral)   Resp 16   Ht 1.537 m (5' 0.5\")   Wt 100.3 kg (221 lb 3.2 oz)   SpO2 98%   BMI 42.49 kg/m   Estimated body mass index is 42.49 kg/m  as calculated from the following:    Height as of this encounter: 1.537 m (5' 0.5\").    Weight as of this encounter: 100.3 kg (221 lb 3.2 oz). Body surface area is 2.07 meters squared.  No Pain (1) Comment: Data Unavailable   No LMP recorded.  Allergies reviewed: Yes  Medications reviewed: Yes    Medications: Medication refills not needed today.  Pharmacy name entered into [employer]: [facility] #[id number] [place], [place] - [address] AT [facility] [address]    Frailty Screening:   Is the patient here for a new oncology consult visit in cancer care? 2. No      Clinical concerns: Return visit 1 year. Malignant neoplasm of upper-outer quadrant of right breast in female, estrogen receptor positive. Pain in right hip going down to foot 1/10.      Karen Fabian CMA            "

## 2024-04-03 ENCOUNTER — TRANSFERRED RECORDS (OUTPATIENT)
Dept: HEALTH INFORMATION MANAGEMENT | Facility: CLINIC | Age: 79
End: 2024-04-03

## 2024-04-03 ENCOUNTER — LAB REQUISITION (OUTPATIENT)
Dept: LAB | Facility: CLINIC | Age: 79
End: 2024-04-03
Payer: COMMERCIAL

## 2024-04-03 DIAGNOSIS — E03.9 HYPOTHYROIDISM, UNSPECIFIED: ICD-10-CM

## 2024-04-03 PROCEDURE — 84443 ASSAY THYROID STIM HORMONE: CPT | Mod: ORL | Performed by: FAMILY MEDICINE

## 2024-04-04 LAB — TSH SERPL DL<=0.005 MIU/L-ACNC: 1.71 UIU/ML (ref 0.3–4.2)

## 2024-05-24 ENCOUNTER — LAB REQUISITION (OUTPATIENT)
Dept: LAB | Facility: CLINIC | Age: 79
End: 2024-05-24
Payer: COMMERCIAL

## 2024-05-24 DIAGNOSIS — Z01.818 ENCOUNTER FOR OTHER PREPROCEDURAL EXAMINATION: ICD-10-CM

## 2024-05-24 PROCEDURE — 80048 BASIC METABOLIC PNL TOTAL CA: CPT | Mod: ORL | Performed by: FAMILY MEDICINE

## 2024-05-25 LAB
ANION GAP SERPL CALCULATED.3IONS-SCNC: 14 MMOL/L (ref 7–15)
BUN SERPL-MCNC: 14.6 MG/DL (ref 8–23)
CALCIUM SERPL-MCNC: 9.9 MG/DL (ref 8.8–10.2)
CHLORIDE SERPL-SCNC: 105 MMOL/L (ref 98–107)
CREAT SERPL-MCNC: 0.76 MG/DL (ref 0.51–0.95)
DEPRECATED HCO3 PLAS-SCNC: 23 MMOL/L (ref 22–29)
EGFRCR SERPLBLD CKD-EPI 2021: 79 ML/MIN/1.73M2
GLUCOSE SERPL-MCNC: 97 MG/DL (ref 70–99)
POTASSIUM SERPL-SCNC: 4.5 MMOL/L (ref 3.4–5.3)
SODIUM SERPL-SCNC: 142 MMOL/L (ref 135–145)

## 2024-10-16 ENCOUNTER — LAB REQUISITION (OUTPATIENT)
Dept: LAB | Facility: CLINIC | Age: 79
End: 2024-10-16
Payer: COMMERCIAL

## 2024-10-16 DIAGNOSIS — R30.0 DYSURIA: ICD-10-CM

## 2024-10-16 PROCEDURE — 87186 SC STD MICRODIL/AGAR DIL: CPT | Mod: ORL | Performed by: FAMILY MEDICINE

## 2024-10-17 LAB — BACTERIA UR CULT: ABNORMAL

## 2024-10-28 ENCOUNTER — LAB REQUISITION (OUTPATIENT)
Dept: LAB | Facility: CLINIC | Age: 79
End: 2024-10-28
Payer: COMMERCIAL

## 2024-10-28 DIAGNOSIS — R30.0 DYSURIA: ICD-10-CM

## 2024-10-28 PROCEDURE — 87086 URINE CULTURE/COLONY COUNT: CPT | Mod: ORL | Performed by: FAMILY MEDICINE

## 2024-10-30 LAB — BACTERIA UR CULT: NORMAL

## 2024-12-13 ENCOUNTER — LAB REQUISITION (OUTPATIENT)
Dept: LAB | Facility: CLINIC | Age: 79
End: 2024-12-13
Payer: COMMERCIAL

## 2024-12-13 DIAGNOSIS — M85.80 OTHER SPECIFIED DISORDERS OF BONE DENSITY AND STRUCTURE, UNSPECIFIED SITE: ICD-10-CM

## 2024-12-13 DIAGNOSIS — E78.5 HYPERLIPIDEMIA, UNSPECIFIED: ICD-10-CM

## 2024-12-13 LAB
CHOLEST SERPL-MCNC: 146 MG/DL
FASTING STATUS PATIENT QL REPORTED: YES
HDLC SERPL-MCNC: 50 MG/DL
LDLC SERPL CALC-MCNC: 61 MG/DL
NONHDLC SERPL-MCNC: 96 MG/DL
TRIGL SERPL-MCNC: 177 MG/DL
VIT D+METAB SERPL-MCNC: 46 NG/ML (ref 20–50)

## 2024-12-13 PROCEDURE — 80061 LIPID PANEL: CPT | Mod: ORL | Performed by: FAMILY MEDICINE

## 2024-12-13 PROCEDURE — 82306 VITAMIN D 25 HYDROXY: CPT | Mod: ORL | Performed by: FAMILY MEDICINE

## 2025-01-03 ENCOUNTER — ANCILLARY PROCEDURE (OUTPATIENT)
Dept: MAMMOGRAPHY | Facility: CLINIC | Age: 80
End: 2025-01-03
Attending: FAMILY MEDICINE
Payer: COMMERCIAL

## 2025-01-03 DIAGNOSIS — Z12.31 VISIT FOR SCREENING MAMMOGRAM: ICD-10-CM

## 2025-01-03 PROCEDURE — 77063 BREAST TOMOSYNTHESIS BI: CPT

## 2025-03-14 ENCOUNTER — HOSPITAL ENCOUNTER (OUTPATIENT)
Dept: BONE DENSITY | Facility: HOSPITAL | Age: 80
Discharge: HOME | End: 2025-03-14
Attending: INTERNAL MEDICINE | Admitting: INTERNAL MEDICINE
Payer: COMMERCIAL

## 2025-03-14 DIAGNOSIS — Z17.0 MALIGNANT NEOPLASM OF UPPER-OUTER QUADRANT OF RIGHT BREAST IN FEMALE, ESTROGEN RECEPTOR POSITIVE (H): ICD-10-CM

## 2025-03-14 DIAGNOSIS — C50.411 MALIGNANT NEOPLASM OF UPPER-OUTER QUADRANT OF RIGHT BREAST IN FEMALE, ESTROGEN RECEPTOR POSITIVE (H): ICD-10-CM

## 2025-03-14 DIAGNOSIS — M85.89 OSTEOPENIA OF MULTIPLE SITES: ICD-10-CM

## 2025-03-14 DIAGNOSIS — Z79.811 AROMATASE INHIBITOR USE: ICD-10-CM

## 2025-03-14 PROCEDURE — 77091 TBS TECHL CALCULATION ONLY: CPT

## 2025-03-14 PROCEDURE — 77080 DXA BONE DENSITY AXIAL: CPT

## 2025-04-04 ENCOUNTER — LAB (OUTPATIENT)
Dept: INFUSION THERAPY | Facility: HOSPITAL | Age: 80
End: 2025-04-04
Attending: INTERNAL MEDICINE
Payer: COMMERCIAL

## 2025-04-04 PROCEDURE — 80053 COMPREHEN METABOLIC PANEL: CPT | Performed by: INTERNAL MEDICINE

## 2025-06-20 ENCOUNTER — LAB REQUISITION (OUTPATIENT)
Dept: LAB | Facility: CLINIC | Age: 80
End: 2025-06-20
Payer: COMMERCIAL

## 2025-06-20 DIAGNOSIS — I10 ESSENTIAL (PRIMARY) HYPERTENSION: ICD-10-CM

## 2025-06-20 DIAGNOSIS — E03.9 HYPOTHYROIDISM, UNSPECIFIED: ICD-10-CM

## 2025-06-20 LAB
ALBUMIN SERPL BCG-MCNC: 4.2 G/DL (ref 3.5–5.2)
ALP SERPL-CCNC: 68 U/L (ref 40–150)
ALT SERPL W P-5'-P-CCNC: 18 U/L (ref 0–50)
ANION GAP SERPL CALCULATED.3IONS-SCNC: 11 MMOL/L (ref 7–15)
AST SERPL W P-5'-P-CCNC: 22 U/L (ref 0–45)
BILIRUB SERPL-MCNC: 0.4 MG/DL
BUN SERPL-MCNC: 18.1 MG/DL (ref 8–23)
CALCIUM SERPL-MCNC: 9.8 MG/DL (ref 8.8–10.4)
CHLORIDE SERPL-SCNC: 106 MMOL/L (ref 98–107)
CREAT SERPL-MCNC: 0.72 MG/DL (ref 0.51–0.95)
EGFRCR SERPLBLD CKD-EPI 2021: 84 ML/MIN/1.73M2
GLUCOSE SERPL-MCNC: 99 MG/DL (ref 70–99)
HCO3 SERPL-SCNC: 26 MMOL/L (ref 22–29)
POTASSIUM SERPL-SCNC: 4.2 MMOL/L (ref 3.4–5.3)
PROT SERPL-MCNC: 6.9 G/DL (ref 6.4–8.3)
SODIUM SERPL-SCNC: 143 MMOL/L (ref 135–145)
TSH SERPL DL<=0.005 MIU/L-ACNC: 0.5 UIU/ML (ref 0.3–4.2)

## 2025-06-20 PROCEDURE — 80053 COMPREHEN METABOLIC PANEL: CPT | Mod: ORL | Performed by: FAMILY MEDICINE

## 2025-06-20 PROCEDURE — 84443 ASSAY THYROID STIM HORMONE: CPT | Mod: ORL | Performed by: FAMILY MEDICINE
